# Patient Record
Sex: FEMALE | Race: OTHER | HISPANIC OR LATINO | Employment: FULL TIME | URBAN - METROPOLITAN AREA
[De-identification: names, ages, dates, MRNs, and addresses within clinical notes are randomized per-mention and may not be internally consistent; named-entity substitution may affect disease eponyms.]

---

## 2018-09-27 ENCOUNTER — HOSPITAL ENCOUNTER (EMERGENCY)
Facility: HOSPITAL | Age: 33
Discharge: HOME | End: 2018-09-27
Attending: EMERGENCY MEDICINE | Admitting: EMERGENCY MEDICINE
Payer: COMMERCIAL

## 2018-09-27 VITALS
OXYGEN SATURATION: 100 % | TEMPERATURE: 98.3 F | DIASTOLIC BLOOD PRESSURE: 61 MMHG | HEART RATE: 75 BPM | RESPIRATION RATE: 16 BRPM | SYSTOLIC BLOOD PRESSURE: 122 MMHG

## 2018-09-27 DIAGNOSIS — K30 INDIGESTION: ICD-10-CM

## 2018-09-27 DIAGNOSIS — R11.0 NAUSEA: Primary | ICD-10-CM

## 2018-09-27 LAB
BACTERIA UR QL AUTO: ABNORMAL /HPF
BILIRUB UR QL STRIP: NEGATIVE
CLARITY UR: ABNORMAL
COLOR UR: YELLOW
EXT PREG TEST URINE: NEGATIVE
GLUCOSE UR STRIP-MCNC: NEGATIVE MG/DL
HGB UR QL STRIP.AUTO: NEGATIVE
KETONES UR STRIP-MCNC: NEGATIVE MG/DL
LEUKOCYTE ESTERASE UR QL STRIP: ABNORMAL
NITRITE UR QL STRIP: NEGATIVE
NON-SQ EPI CELLS URNS QL MICRO: ABNORMAL /HPF
PH UR STRIP.AUTO: 6 [PH] (ref 4.5–8)
PROT UR STRIP-MCNC: NEGATIVE MG/DL
RBC #/AREA URNS AUTO: ABNORMAL /HPF
SP GR UR STRIP.AUTO: 1.02 (ref 1–1.03)
UROBILINOGEN UR QL STRIP.AUTO: 0.2 E.U./DL
WBC #/AREA URNS AUTO: ABNORMAL /HPF

## 2018-09-27 PROCEDURE — 99283 EMERGENCY DEPT VISIT LOW MDM: CPT

## 2018-09-27 PROCEDURE — 81001 URINALYSIS AUTO W/SCOPE: CPT

## 2018-09-27 PROCEDURE — 81025 URINE PREGNANCY TEST: CPT | Performed by: EMERGENCY MEDICINE

## 2018-09-27 RX ORDER — FAMOTIDINE 20 MG/1
20 TABLET, FILM COATED ORAL ONCE
Status: DISCONTINUED | OUTPATIENT
Start: 2018-09-27 | End: 2018-09-27 | Stop reason: HOSPADM

## 2018-09-27 RX ORDER — ONDANSETRON 4 MG/1
4 TABLET, ORALLY DISINTEGRATING ORAL ONCE
Status: DISCONTINUED | OUTPATIENT
Start: 2018-09-27 | End: 2018-09-27 | Stop reason: HOSPADM

## 2018-09-27 RX ORDER — ONDANSETRON 4 MG/1
4 TABLET, FILM COATED ORAL EVERY 6 HOURS
Qty: 7 TABLET | Refills: 0 | Status: SHIPPED | OUTPATIENT
Start: 2018-09-27 | End: 2022-01-23

## 2018-09-27 RX ORDER — FAMOTIDINE 20 MG/1
20 TABLET, FILM COATED ORAL 2 TIMES DAILY
Qty: 28 TABLET | Refills: 0 | Status: SHIPPED | OUTPATIENT
Start: 2018-09-27 | End: 2022-01-23

## 2018-09-27 RX ORDER — PHENTERMINE HYDROCHLORIDE 37.5 MG/1
37.5 CAPSULE ORAL EVERY MORNING
COMMUNITY
End: 2022-01-23

## 2018-09-27 NOTE — ED PROVIDER NOTES
History  Chief Complaint   Patient presents with    Nausea     Patient reporting she has been experiencing nausea X3 days  Also reporting abdominal discomfort  Denies fevers, denies diarrhea  History provided by:  Patient  Nausea   The primary symptoms include nausea  Primary symptoms do not include fever, weight loss, fatigue, abdominal pain, vomiting, diarrhea, melena, hematemesis, jaundice, hematochezia, dysuria, myalgias, arthralgias or rash  The illness began 3 to 5 days ago  The onset was gradual  The problem has not changed since onset  The illness does not include chills, anorexia, dysphagia, odynophagia, bloating, constipation, back pain or itching  Associated symptoms comments: indigestion  Associated medical issues do not include inflammatory bowel disease, GERD, gallstones, gastric bypass or bowel resection  Prior to Admission Medications   Prescriptions Last Dose Informant Patient Reported? Taking? phentermine 37 5 MG capsule   Yes Yes   Sig: Take 37 5 mg by mouth every morning      Facility-Administered Medications: None       History reviewed  No pertinent past medical history  Past Surgical History:   Procedure Laterality Date    LAPAROSCOPY      REDUCTION MAMMAPLASTY         History reviewed  No pertinent family history  I have reviewed and agree with the history as documented  Social History   Substance Use Topics    Smoking status: Light Tobacco Smoker    Smokeless tobacco: Not on file    Alcohol use Yes      Comment: occasional         Review of Systems   Constitutional: Negative for appetite change, chills, diaphoresis, fatigue, fever and weight loss  HENT: Negative for congestion, dental problem and rhinorrhea  Eyes: Negative for pain  Respiratory: Negative for chest tightness and shortness of breath  Cardiovascular: Negative for chest pain and leg swelling  Gastrointestinal: Positive for nausea   Negative for abdominal pain, anorexia, bloating, blood in stool, constipation, diarrhea, dysphagia, hematemesis, hematochezia, jaundice, melena and vomiting  Endocrine: Negative for polydipsia, polyphagia and polyuria  Genitourinary: Negative for difficulty urinating, dyspareunia, dysuria, enuresis, flank pain, frequency, hematuria, pelvic pain, vaginal bleeding and vaginal discharge  Musculoskeletal: Negative for arthralgias, back pain and myalgias  Skin: Negative for color change, itching and rash  Neurological: Negative for dizziness, weakness, light-headedness and headaches  Psychiatric/Behavioral: Negative for hallucinations and suicidal ideas  Physical Exam  Physical Exam   Constitutional: She is oriented to person, place, and time  She appears well-developed and well-nourished  HENT:   Mouth/Throat: No oropharyngeal exudate  TMs normal bilaterally no pharyngeal erythema no rhinorrhea nontender palpation of sinuses, normal looking turbinates   Eyes: Conjunctivae and EOM are normal    Neck: Normal range of motion  Neck supple  No meningeal signs   Cardiovascular: Normal rate, regular rhythm, normal heart sounds and intact distal pulses  Pulmonary/Chest: Effort normal and breath sounds normal  No respiratory distress  She has no wheezes  She has no rales  She exhibits no tenderness  Abdominal: Soft  Bowel sounds are normal  She exhibits no distension and no mass  There is no tenderness  No hernia  No cvat   Musculoskeletal: Normal range of motion  She exhibits no edema  Lymphadenopathy:     She has no cervical adenopathy  Neurological: She is alert and oriented to person, place, and time  No cranial nerve deficit  Skin: No rash noted  No erythema  No edema   Psychiatric: She has a normal mood and affect  Her behavior is normal    Nursing note and vitals reviewed        Vital Signs  ED Triage Vitals   Temperature Pulse Respirations Blood Pressure SpO2   09/27/18 1137 09/27/18 1135 09/27/18 1135 09/27/18 1135 09/27/18 1135   98 3 °F (36 8 °C) 75 16 122/61 100 %      Temp Source Heart Rate Source Patient Position - Orthostatic VS BP Location FiO2 (%)   09/27/18 1137 09/27/18 1135 09/27/18 1135 09/27/18 1135 --   Oral Monitor Sitting Right arm       Pain Score       --                  Vitals:    09/27/18 1135   BP: 122/61   Pulse: 75   Patient Position - Orthostatic VS: Sitting       Visual Acuity      ED Medications  Medications   ondansetron (ZOFRAN-ODT) dispersible tablet 4 mg (not administered)   famotidine (PEPCID) tablet 20 mg (not administered)       Diagnostic Studies  Results Reviewed     Procedure Component Value Units Date/Time    POCT urinalysis dipstick [[id number]]  (Abnormal) Resulted:  09/27/18 1228    Lab Status:  Final result Specimen:  Urine from Urine, Other Updated:  09/27/18 1228    POCT pregnancy, urine [[id number]]  (Normal) Resulted:  09/27/18 1228    Lab Status:  Final result Updated:  09/27/18 1228     EXT PREG TEST UR (Ref: Negative) Negative    Urine Microscopic [[id number]] Collected:  09/27/18 1214    Lab Status:   In process Specimen:  Urine from Urine, Clean Catch Updated:  09/27/18 1210    ED Urine Macroscopic [[id number]]  (Abnormal) Collected:  09/27/18 1214    Lab Status:  Final result Specimen:  Urine Updated:  09/27/18 1204     Color, UA Yellow     Clarity, UA Cloudy     pH, UA 6 0     Leukocytes, UA Trace (A)     Nitrite, UA Negative     Protein, UA Negative mg/dl      Glucose, UA Negative mg/dl      Ketones, UA Negative mg/dl      Urobilinogen, UA 0 2 E U /dl      Bilirubin, UA Negative     Blood, UA Negative     Specific Gravity, UA 1 020    Narrative:       CLINITEK RESULT                 No orders to display              Procedures  Procedures       Phone Contacts  ED Phone Contact    ED Course                               MDM  Number of Diagnoses or Management Options  Indigestion:   Nausea:   Diagnosis management comments: Nausea and indigestion with a benign exam-will do urine dip/preg, tx for gastritis    CritCare Time    Disposition  Final diagnoses:   Nausea   Indigestion     Time reflects when diagnosis was documented in both MDM as applicable and the Disposition within this note     Time User Action Codes Description Comment    9/27/2018 12:27 PM Claire Jose Add [R11 0] Nausea     9/27/2018 12:27 PM Frantzak Zandraharis Carmen Add [facility] Indigestion       ED Disposition     ED Disposition Condition Comment    Discharge  [address] discharge to home/self care  Condition at discharge: Good        Follow-up Information     Follow up With Specialties Details Why Contact Info    Infolink  Schedule an appointment as soon as possible for a visit in 2 days  [phone]            Patient's Medications   Discharge Prescriptions    FAMOTIDINE (PEPCID) 20 MG TABLET    Take 1 tablet (20 mg total) by mouth 2 (two) times a day for 28 doses       Start Date: 9/27/2018 End Date: 10/11/2018       Order Dose: 20 mg       Quantity: 28 tablet    Refills: 0    ONDANSETRON (ZOFRAN) 4 MG TABLET    Take 1 tablet (4 mg total) by mouth every 6 (six) hours for 7 doses       Start Date: 9/27/2018 End Date: 9/29/2018       Order Dose: 4 mg       Quantity: 7 tablet    Refills: 0     No discharge procedures on file      ED Provider  Electronically Signed by           Jay Garcia MD  09/27/18 9340

## 2018-09-27 NOTE — DISCHARGE INSTRUCTIONS
Náuseas y vómitos agudos   CUIDADO AMBULATORIO:   Náuseas y vómitos agudos  comienzan de forma repentina, [place] rápidamente y de jesus un período breve de Elaine  Las causas comunes incluyen embarazo, alcohol, infección y medicamentos  Marge Caper en la tori, un ataque al corazón o el desequilibrio del oído interno también pueden causar náuseas y vómitos agudos  Busque atención médica de inmediato si:   · Usted nota bandar en kat vómito o en dee evacuaciones  · Usted siente un dolor súbito e intenso en el pecho y la parte superior de kat abdomen después de tener vómitos antoni o tratar de vomitar  · Usted tiene el greta y el pecho inflamados  · [employer], tiene frío, sed y sequedad en los ojos y la boca  · Usted está orinando muy poco o nada en absoluto  · Usted tiene debilidad muscular, calambres en las piernas y dificultad para respirar  · Kat corazón late más rápido que de costumbre  · Usted continúa vomitando por más de 48 horas  Pregúntele a kat [place] vitaminas y minerales son adecuados para usted  · Usted tiene arcadas secas (vómitos sin que salga nada) frecuentes  · Dee náusea y vómitos no mejoran ni desaparecen después de usar el medicamento  · Usted tiene preguntas o inquietudes acerca de kat condición o tratamiento  El tratamiento para las náuseas y vómitos agudos  puede incluir la administración de medicamentos para calmar kat estómago y detener los vómitos  Es posible que deban administrarle líquidos por vía intravenosa si usted está deshidratado  Evite o controle las náuseas y los vómitos agudos:   · No consuma alcohol  El alcohol podría causarle malestar o irritación estomacal  Janice cantidad elevada de alcohol también puede causar náuseas y vómitos agudos  · Controle el estrés  Los rick de [place] que son el resultado de tensión nerviosa pueden causar náuseas y vómitos  Busque la manera de relajarse y controlar el estrés   Descanse y duerma más     · [place] más líquidos selvin se le indique  Los vómitos pueden llevar a la deshidratación  Es importante beber más líquidos para ayudar a reemplazar los fluidos corporales perdidos  Pregunte a chou médico sobre la cantidad de líquido que necesita maral todos los días y cuáles le recomienda  Chou médico podría recomendarle que tome larisa solución de rehidratación oral (SRO)  Las soluciones de rehidratación oral contienen la cantidad [facility] [place], sales y azúcar que necesita para restituir los líquidos que perdió chou organismo  Pregunte qué tipo de solución de rehidratación oral debe usar, qué cantidad debe maral y dónde puede obtenerla  · Ingiera comidas más pequeñas, más a menudo  Coma pequeñas cantidades de comida cada 2 o 3 horas, incluso si no tiene hambre  Donavan náuseas podrían disminuir si tiene comida en el estómago  · Hable con chou médico antes de maral medicamentos de [facility]  Estos medicamentos pueden causar problemas serios si los [place] junto con ciertos medicamentos o si tiene determinadas condiciones médicas  Podrían tener problemas si [place] larisa dosis demasiado noris o los [place] tran más tiempo de lo indicado  Siga las indicaciones de la etiqueta al pie de la [place]  Acuda a donavan consultas de control con chou médico según le indicaron  Anote donavan preguntas para que se acuerde de hacerlas tran donavan visitas  © 2017 [address] Information is for End User's use only and may not be sold, redistributed or otherwise used for commercial purposes  All illustrations and images included in CareNotes® are the copyrighted property of A D A M , Inc  or Sonu Luu  Esta información es sólo para uso en educación  Chou intención no es darle un consejo médico sobre enfermedades o tratamientos  Colsulte con chou [place] farmacéutico antes de seguir cualquier régimen médico para saber si es seguro y efectivo para usted      Indigestión   LO QUE NECESITA SABER:   La indigestión, también llamada dispepsia, es The [employer], sensación rápida de llenura, dolor o quemazón en el [facility] o [facility]  Es probable que la causa no sea conocida  INSTRUCCIONES SOBRE EL [facility] HOSPITALARIA:   Regrese a la mirza de emergencias si:   · Usted tiene dificultad para tragar  · Usted tiene dolor severo en el estómago que no se va incluso después de maral los medicamentos para el dolor  · Dee evacuaciones intestinales son oscuras o vomita con bandar  · Usted tiene náusea o vómito severos  · Usted siente larisa masa o bulto en el abdomen  Pregúntele a chou Dianna Timothy vitaminas y minerales son adecuados para usted  · Usted tiene dolor, malestar o estreñimiento  · Usted tiene náusea leve a moderada con vómitos e inflamación del estómago  · Chou piel palidece, y se siente más débil y más cansado que lo usual      · Usted tiene preguntas o inquietudes acerca de chou condición o cuidado  Medicamentos:   · Medicamentos,  podrían administrarse para ayudar a disminuir la cantidad de ácido en chou estómago  · [place] dee medicamentos selvin se le haya indicado  Consulte con chou médico si usted alexa que cohu medicamento no le está ayudando o si presenta efectos secundarios  Infórmele si es alérgico a algún medicamento  Mantenga larisa lista actualizada de los [place], las vitaminas y los productos herbales que anabel  Incluya los siguientes datos de los medicamentos: cantidad, frecuencia y motivo de administración  Traiga con usted la lista o los envases de la píldoras a dee citas de seguimiento  Lleve la lista de los medicamentos con usted en moses de larisa emergencia  [facility] síntomas:   · No coma alimentos que pueden irritar chou estómago selvin alimentos picantes o grasosos  No consuma bebidas que contengan cafeína o alcohol  Los chocolates, menta, hierbabuena y cítricos pueden también empeorar dee síntomas  Coma porciones pequeñas varias veces al día en lugar de comidas grandes       · Límite los medicamentos que [address] , selvin los [place], esteroides o narcóticos  Kat médico puede sugerirle otro medicamento que sea [employer]  Consulte con kat médico antes de maral cualquier medicamento sin receta  · Encuentre maneras de reducir el estrés  Aprenda nuevas técnicas de relajación, selvin el ejercicio, la respiración profunda, meditación o escuchar música  · No fume  La nicotina y otros químicos contenidos en los cigarrillos y puros pueden causar indigestión  Pida información a kat médico si usted actualmente fuma y necesita ayuda para dejar de fumar  Los cigarrillos electrónicos o tabaco sin humo todavía contienen nicotina  Consulte con kat médico antes de [employer]  Acuda a donavan consultas de control con kat médico según le indicaron  Es probable que lo Garrett Raid a un gastroenterólogo  Anote donavan preguntas para que se acuerde de hacerlas tran donavan visitas  © 2017 [address] Information is for End User's use only and may not be sold, redistributed or otherwise used for commercial purposes  All illustrations and images included in CareNotes® are the copyrighted property of [employer]  or Sonu Luu  Esta información es sólo para uso en educación  Kat intención no es darle un consejo médico sobre enfermedades o tratamientos  Colsulte con kat Ozell Fabtracee farmacéutico antes de seguir cualquier régimen médico para saber si es seguro y efectivo para usted

## 2019-05-17 ENCOUNTER — HOSPITAL ENCOUNTER (OUTPATIENT)
Dept: RADIOLOGY | Facility: HOSPITAL | Age: 34
Discharge: HOME | End: 2019-05-17
Attending: PODIATRIST
Payer: COMMERCIAL

## 2019-05-17 ENCOUNTER — TRANSCRIBE ORDERS (OUTPATIENT)
Dept: ADMINISTRATIVE | Facility: HOSPITAL | Age: 34
End: 2019-05-17

## 2019-05-17 DIAGNOSIS — M79.672 PAIN IN LEFT FOOT: ICD-10-CM

## 2019-05-17 DIAGNOSIS — M79.671 PAIN IN RIGHT FOOT: ICD-10-CM

## 2019-05-17 DIAGNOSIS — M79.671 PAIN IN RIGHT FOOT: Primary | ICD-10-CM

## 2019-05-17 PROCEDURE — 73630 X-RAY EXAM OF FOOT: CPT

## 2019-12-25 ENCOUNTER — HOSPITAL ENCOUNTER (EMERGENCY)
Facility: HOSPITAL | Age: 34
Discharge: HOME | End: 2019-12-25
Attending: EMERGENCY MEDICINE
Payer: COMMERCIAL

## 2019-12-25 VITALS
RESPIRATION RATE: 17 BRPM | OXYGEN SATURATION: 96 % | TEMPERATURE: 98.5 F | DIASTOLIC BLOOD PRESSURE: 72 MMHG | HEART RATE: 77 BPM | SYSTOLIC BLOOD PRESSURE: 123 MMHG | WEIGHT: 239.2 LBS

## 2019-12-25 DIAGNOSIS — H66.90 OTITIS MEDIA: Primary | ICD-10-CM

## 2019-12-25 DIAGNOSIS — J02.9 PHARYNGITIS, UNSPECIFIED ETIOLOGY: ICD-10-CM

## 2019-12-25 LAB
EXT PREG TEST URINE: NEGATIVE
EXT. CONTROL ED NAV: NORMAL

## 2019-12-25 PROCEDURE — 81025 URINE PREGNANCY TEST: CPT | Performed by: NURSE PRACTITIONER

## 2019-12-25 PROCEDURE — 99283 EMERGENCY DEPT VISIT LOW MDM: CPT | Performed by: EMERGENCY MEDICINE

## 2019-12-25 PROCEDURE — 99283 EMERGENCY DEPT VISIT LOW MDM: CPT

## 2019-12-25 RX ORDER — AMOXICILLIN AND CLAVULANATE POTASSIUM 875; 125 MG/1; MG/1
1 TABLET, FILM COATED ORAL ONCE
Status: COMPLETED | OUTPATIENT
Start: 2019-12-25 | End: 2019-12-25

## 2019-12-25 RX ORDER — AMOXICILLIN AND CLAVULANATE POTASSIUM 875; 125 MG/1; MG/1
1 TABLET, FILM COATED ORAL EVERY 12 HOURS
Qty: 14 TABLET | Refills: 0 | Status: SHIPPED | OUTPATIENT
Start: 2019-12-25 | End: 2020-01-01

## 2019-12-25 RX ADMIN — AMOXICILLIN AND CLAVULANATE POTASSIUM 1 TABLET: 875; 125 TABLET, FILM COATED ORAL at 03:36

## 2019-12-25 NOTE — DISCHARGE INSTRUCTIONS
You have a right ear infection and a sorethroat    You have been placed on Augmentin - take as prescribed  You are to take tylenol and motrin as needed for pain  Follow up with your PCP  You may do warm salt water gargles several times a day for sorethroat pain

## 2019-12-25 NOTE — ED PROVIDER NOTES
History  Chief Complaint   Patient presents with    Sore Throat     sore throat and right ear ache for two days, tylenol 2 hours PTA     This is a morbidly obese female who states has had sorethroat and right ear ache with neck swelling for the last few days  She states she took tylenol 2 hours prior to ED visit  LMP 11-3-19         History provided by:  Medical records and patient   used: No        Prior to Admission Medications   Prescriptions Last Dose Informant Patient Reported? Taking?   famotidine (PEPCID) 20 mg tablet   No No   Sig: Take 1 tablet (20 mg total) by mouth 2 (two) times a day for 28 doses   ondansetron (ZOFRAN) 4 mg tablet   No No   Sig: Take 1 tablet (4 mg total) by mouth every 6 (six) hours for 7 doses   phentermine 37 5 MG capsule   Yes No   Sig: Take 37 5 mg by mouth every morning      Facility-Administered Medications: None       History reviewed  No pertinent past medical history  Past Surgical History:   Procedure Laterality Date    LAPAROSCOPY      REDUCTION MAMMAPLASTY         History reviewed  No pertinent family history  I have reviewed and agree with the history as documented  Social History     Tobacco Use    Smoking status: Light Tobacco Smoker   Substance Use Topics    Alcohol use: Yes     Comment: occasional     Drug use: No        Review of Systems   Constitutional: Negative  HENT: Positive for ear pain and sore throat  Eyes: Negative  Respiratory: Negative  Cardiovascular: Negative  Gastrointestinal: Negative  Endocrine: Negative  Genitourinary: Negative  Musculoskeletal: Negative  Skin: Negative  Allergic/Immunologic: Negative  Neurological: Negative  Hematological: Negative  Psychiatric/Behavioral: Negative  Physical Exam  Physical Exam   Constitutional: She is oriented to person, place, and time  She appears well-developed and well-nourished  Non-toxic appearance  She does not appear ill   No distress  HENT:   Head: Normocephalic and atraumatic  Right Ear: Hearing normal  There is swelling  A middle ear effusion is present  Left Ear: Hearing and tympanic membrane normal    Mouth/Throat: Uvula is midline and mucous membranes are normal  Posterior oropharyngeal erythema present  Tonsils are 2+ on the right  Tonsils are 2+ on the left  No tonsillar exudate  Eyes: Pupils are equal, round, and reactive to light  EOM are normal    Neck: Normal range of motion  Neck supple  Right    Cardiovascular: Normal rate, regular rhythm and normal heart sounds  Pulmonary/Chest: Effort normal and breath sounds normal    Abdominal: Soft  Lymphadenopathy:     She has cervical adenopathy  Neurological: She is alert and oriented to person, place, and time  Skin: Skin is warm and dry  Capillary refill takes less than 2 seconds  Psychiatric: She has a normal mood and affect  Her behavior is normal    Nursing note and vitals reviewed        Vital Signs  ED Triage Vitals [12/25/19 0308]   Temperature Pulse Respirations Blood Pressure SpO2   98 5 °F (36 9 °C) 77 17 123/72 96 %      Temp src Heart Rate Source Patient Position - Orthostatic VS BP Location FiO2 (%)   -- Monitor Sitting Right arm --      Pain Score       4           Vitals:    12/25/19 0308   BP: 123/72   Pulse: 77   Patient Position - Orthostatic VS: Sitting         Visual Acuity      ED Medications  Medications   amoxicillin-clavulanate (AUGMENTIN) 875-125 mg per tablet 1 tablet (1 tablet Oral Given 12/25/19 0336)       Diagnostic Studies  Results Reviewed     Procedure Component Value Units Date/Time    POCT pregnancy, urine [[id number]]  (Normal) Resulted:  12/25/19 0332    Lab Status:  Final result Updated:  12/25/19 0332     EXT PREG TEST UR (Ref: Negative) Negative     Control Valid                 No orders to display              Procedures  Procedures         ED Course                               MDM  Number of Diagnoses or Management Options  Diagnosis management comments: Pharyngitis  ROM    Plan  Augmentin    Pt verbalizes understanding of dc instructions and follow up         Amount and/or Complexity of Data Reviewed  Clinical lab tests: ordered and reviewed  Review and summarize past medical records: yes          Disposition  Final diagnoses:   Otitis media   Pharyngitis, unspecified etiology     Time reflects when diagnosis was documented in both MDM as applicable and the Disposition within this note     Time User Action Codes Description Comment    12/25/2019  3:36 AM Selene Clan Add [H66 90] Otitis media     12/25/2019  3:36 AM Selene Clan Add [J02 9] Pharyngitis, unspecified etiology       ED Disposition     ED Disposition Condition Date/Time Comment    Discharge Stable Wed Dec 25, 2019  3:36 AM Jetty Settle discharge to home/self care  Follow-up Information     Follow up With Specialties Details Why Contact Info Additional [address] [facility] Emergency Department Emergency Medicine  If symptoms worsen [place] [id number]  [phone] [place] [facility], [address] [place] [address], [address] [facility],Sixth [facility] Medicine  call for a family physician [address], [address] [facility] [address]  [address], [address], [address] [place], [place], [address] [facility] [address]          Patient's Medications   Discharge Prescriptions    AMOXICILLIN-CLAVULANATE (AUGMENTIN) 875-125 MG PER TABLET    Take 1 tablet by mouth every 12 (twelve) hours for 7 days       Start Date: 12/25/2019End Date: 1/1/2020       Order Dose: 1 tablet       Quantity: 14 tablet    Refills: 0     No discharge procedures on file      ED Provider  Electronically Signed by           Alison Larsen  12/25/19 2091

## 2022-01-23 ENCOUNTER — HOSPITAL ENCOUNTER (EMERGENCY)
Facility: HOSPITAL | Age: 37
Discharge: HOME | End: 2022-01-23
Attending: EMERGENCY MEDICINE
Payer: MEDICARE

## 2022-01-23 VITALS
DIASTOLIC BLOOD PRESSURE: 63 MMHG | SYSTOLIC BLOOD PRESSURE: 131 MMHG | BODY MASS INDEX: 41.45 KG/M2 | TEMPERATURE: 98 F | OXYGEN SATURATION: 99 % | HEART RATE: 71 BPM | WEIGHT: 264.11 LBS | RESPIRATION RATE: 18 BRPM | HEIGHT: 67 IN

## 2022-01-23 DIAGNOSIS — Z76.89 ENCOUNTER FOR ASSESSMENT OF STD EXPOSURE: Primary | ICD-10-CM

## 2022-01-23 LAB
BILIRUB UR QL STRIP: NEGATIVE
CLARITY UR: CLEAR
COLOR UR: YELLOW
EXT PREG TEST URINE: NEGATIVE
EXT. CONTROL ED NAV: NORMAL
GLUCOSE UR STRIP-MCNC: NEGATIVE MG/DL
HGB UR QL STRIP.AUTO: NEGATIVE
KETONES UR STRIP-MCNC: NEGATIVE MG/DL
LEUKOCYTE ESTERASE UR QL STRIP: NEGATIVE
NITRITE UR QL STRIP: NEGATIVE
PH UR STRIP.AUTO: 5.5 [PH] (ref 4.5–8)
PROT UR STRIP-MCNC: NEGATIVE MG/DL
SP GR UR STRIP.AUTO: >=1.03 (ref 1–1.03)
UROBILINOGEN UR QL STRIP.AUTO: 0.2 E.U./DL

## 2022-01-23 PROCEDURE — 99283 EMERGENCY DEPT VISIT LOW MDM: CPT

## 2022-01-23 PROCEDURE — 81003 URINALYSIS AUTO W/O SCOPE: CPT

## 2022-01-23 PROCEDURE — 99282 EMERGENCY DEPT VISIT SF MDM: CPT | Performed by: EMERGENCY MEDICINE

## 2022-01-23 PROCEDURE — 87591 N.GONORRHOEAE DNA AMP PROB: CPT | Performed by: EMERGENCY MEDICINE

## 2022-01-23 PROCEDURE — 81025 URINE PREGNANCY TEST: CPT | Performed by: EMERGENCY MEDICINE

## 2022-01-23 PROCEDURE — 87491 CHLMYD TRACH DNA AMP PROBE: CPT | Performed by: EMERGENCY MEDICINE

## 2022-01-23 NOTE — ED PROVIDER NOTES
History  Chief Complaint   Patient presents with    Exposure to STD     Patient's partner noticed pimple on penis yesterday and patient would like to get checked out for possible STD  Patient reports she has zero symptoms  Denies urinary symptoms, denies vaginal burning/itching/discharge  A 80-year-old female with no significant past medical history; presents requesting STD testing  Patient's partner (who was also a patient in the ED) noticed a pimple along the shaft of his penis, which prompted patient requesting STD testing  Patient is currently asymptomatic  Patient denies recent fever, chills, chest pain, shortness of breath, abdominal pain, nausea, vomiting, diarrhea, dysuria, vaginal bleeding, vaginal discharge, peripheral edema and rashes  A/P:  Encounter for STD testing, patient currently asymptomatic  Pelvic exam within normal limits  Will check urine for infection and pregnancy  GC/chlamydia swab sent  Patient is requesting blood work for additional testing including HIV, recommend she follow-up with her OBGYN or the [facility]  History provided by:  Patient and medical records  Exposure to STD      None       History reviewed  No pertinent past medical history  Past Surgical History:   Procedure Laterality Date    LAPAROSCOPY      REDUCTION MAMMAPLASTY         History reviewed  No pertinent family history  I have reviewed and agree with the history as documented  E-Cigarette/Vaping     E-Cigarette/Vaping Substances     Social History     Tobacco Use    Smoking status: Never Smoker    Smokeless tobacco: Never Used   Substance Use Topics    Alcohol use: Yes     Comment: occasional     Drug use: No       Review of Systems   All other systems reviewed and are negative        Physical Exam  Physical Exam  General Appearance: alert and oriented, nad, non toxic appearing  Skin:  Warm, dry, intact  HEENT: atraumatic, normocephalic  Neck: Supple, trachea midline  Cardiac: RRR; no murmurs, rub, gallops  Pulmonary: lungs CTAB; no wheezes, rales, rhonchi  Gastrointestinal: abdomen soft, nontender, nondistended; no guarding or rebound tenderness; good bowel sounds, no mass or bruits  Genitourinary: Exam chaperoned by Nirav Rebollar RN  Pelvic exam completed, no vaginal discharge or bleeding  No vaginal vesicles or lesions  Extremities:  no pedal edema, 2+ pulses; no calf tenderness, no clubbing, no cyanosis  Neuro:  no focal motor or sensory deficits, CN 2-12 grossly intact  Psych:  Normal mood and affect, normal judgement and insight      Vital Signs  ED Triage Vitals [01/23/22 0757]   Temperature Pulse Respirations Blood Pressure SpO2   98 °F (36 7 °C) 71 18 131/63 99 %      Temp Source Heart Rate Source Patient Position - Orthostatic VS BP Location FiO2 (%)   Oral Monitor Sitting Right arm --      Pain Score       No Pain           Vitals:    01/23/22 0757   BP: 131/63   Pulse: 71   Patient Position - Orthostatic VS: Sitting         Visual Acuity      ED Medications  Medications - No data to display    Diagnostic Studies  Results Reviewed     Procedure Component Value Units Date/Time    Chlamydia/GC amplified DNA by PCR [[id number]] Collected: 01/23/22 0850    Lab Status:  In process Specimen: Endocervical Updated: 01/23/22 0853    POCT pregnancy, urine [[id number]]  (Normal) Resulted: 01/23/22 0833    Lab Status: Final result Updated: 01/23/22 0833     EXT PREG TEST UR (Ref: Negative) negative     Control valid    Urine Macroscopic, POC [[id number]] Collected: 01/23/22 0831    Lab Status: Final result Specimen: Urine Updated: 01/23/22 0832     Color, UA Yellow     Clarity, UA Clear     pH, UA 5 5     Leukocytes, UA Negative     Nitrite, UA Negative     Protein, UA Negative mg/dl      Glucose, UA Negative mg/dl      Ketones, UA Negative mg/dl      Urobilinogen, UA 0 2 E U /dl      Bilirubin, UA Negative     Blood, UA Negative     Specific Gravity, UA >=1 030    Narrative:      CLINITEK RESULT No orders to display              Procedures  Procedures         ED Course                                             MDM    Disposition  Final diagnoses:   Encounter for assessment of STD exposure     Time reflects when diagnosis was documented in both MDM as applicable and the Disposition within this note     Time User Action Codes Description Comment    1/23/2022  8:52 AM Janet Shepard Add [Z76 89] Encounter for assessment of STD exposure       ED Disposition     ED Disposition Condition Date/Time Comment    Discharge Stable Sun Jan 23, 2022  8:52 AM Ruddy Maldonado discharge to home/self care  Follow-up Information     Follow up With Specialties Details Why Contact Info    OBGYN  Schedule an appointment as soon as possible for a visit in 3 days For re-evaluation     [facility] Internal Medicine Schedule an appointment as soon as possible for a visit  For re-evaluation [address]            There are no discharge medications for this patient  No discharge procedures on file      PDMP Review     None          ED Provider  Electronically Signed by           Ava Nieves DO  01/23/22 8809

## 2022-01-23 NOTE — DISCHARGE INSTRUCTIONS
If your test results are positive you will receive a phone call from the ED with further instruction  You can follow-up with your OBGYN or the [facility] for further STD testing

## 2022-01-23 NOTE — ED NOTES
Patient ambulatory to the bathroom to provide a urine sample        Devaughn Corona RN  01/23/22 2337

## 2022-01-25 LAB
C TRACH DNA SPEC QL NAA+PROBE: NEGATIVE
N GONORRHOEA DNA SPEC QL NAA+PROBE: NEGATIVE

## 2022-02-08 ENCOUNTER — HOSPITAL ENCOUNTER (EMERGENCY)
Facility: HOSPITAL | Age: 37
Discharge: HOME | End: 2022-02-08
Attending: EMERGENCY MEDICINE | Admitting: EMERGENCY MEDICINE
Payer: OTHER MISCELLANEOUS

## 2022-02-08 ENCOUNTER — APPOINTMENT (EMERGENCY)
Dept: RADIOLOGY | Facility: HOSPITAL | Age: 37
End: 2022-02-08

## 2022-02-08 VITALS
OXYGEN SATURATION: 99 % | HEIGHT: 67 IN | SYSTOLIC BLOOD PRESSURE: 148 MMHG | BODY MASS INDEX: 42.46 KG/M2 | RESPIRATION RATE: 18 BRPM | WEIGHT: 270.5 LBS | HEART RATE: 64 BPM | DIASTOLIC BLOOD PRESSURE: 69 MMHG | TEMPERATURE: 98.1 F

## 2022-02-08 DIAGNOSIS — S61.219A FINGER LACERATION: ICD-10-CM

## 2022-02-08 DIAGNOSIS — S61.309A AVULSION OF FINGERNAIL, INITIAL ENCOUNTER: Primary | ICD-10-CM

## 2022-02-08 PROCEDURE — 11730 AVULSION NAIL PLATE SIMPLE 1: CPT | Performed by: EMERGENCY MEDICINE

## 2022-02-08 PROCEDURE — 99285 EMERGENCY DEPT VISIT HI MDM: CPT | Performed by: EMERGENCY MEDICINE

## 2022-02-08 PROCEDURE — 90715 TDAP VACCINE 7 YRS/> IM: CPT

## 2022-02-08 PROCEDURE — 90471 IMMUNIZATION ADMIN: CPT

## 2022-02-08 PROCEDURE — 99283 EMERGENCY DEPT VISIT LOW MDM: CPT

## 2022-02-08 PROCEDURE — 73130 X-RAY EXAM OF HAND: CPT

## 2022-02-08 PROCEDURE — 12001 RPR S/N/AX/GEN/TRNK 2.5CM/<: CPT | Performed by: EMERGENCY MEDICINE

## 2022-02-08 RX ORDER — BUPIVACAINE HYDROCHLORIDE 2.5 MG/ML
10 INJECTION, SOLUTION EPIDURAL; INFILTRATION; INTRACAUDAL ONCE
Status: COMPLETED | OUTPATIENT
Start: 2022-02-08 | End: 2022-02-08

## 2022-02-08 RX ORDER — ACETAMINOPHEN 325 MG/1
650 TABLET ORAL ONCE
Status: COMPLETED | OUTPATIENT
Start: 2022-02-08 | End: 2022-02-08

## 2022-02-08 RX ORDER — NAPROXEN 500 MG/1
500 TABLET ORAL 2 TIMES DAILY WITH MEALS
Qty: 30 TABLET | Refills: 0 | Status: SHIPPED | OUTPATIENT
Start: 2022-02-08

## 2022-02-08 RX ORDER — CEPHALEXIN 500 MG/1
500 CAPSULE ORAL EVERY 6 HOURS SCHEDULED
Qty: 20 CAPSULE | Refills: 0 | Status: SHIPPED | OUTPATIENT
Start: 2022-02-08 | End: 2022-02-13

## 2022-02-08 RX ORDER — CEPHALEXIN 250 MG/1
500 CAPSULE ORAL ONCE
Status: COMPLETED | OUTPATIENT
Start: 2022-02-08 | End: 2022-02-08

## 2022-02-08 RX ORDER — CEPHALEXIN 250 MG/1
500 CAPSULE ORAL EVERY 6 HOURS SCHEDULED
Status: DISCONTINUED | OUTPATIENT
Start: 2022-02-08 | End: 2022-02-08

## 2022-02-08 RX ADMIN — BUPIVACAINE HYDROCHLORIDE 10 ML: 2.5 INJECTION, SOLUTION EPIDURAL; INFILTRATION; INTRACAUDAL at 09:36

## 2022-02-08 RX ADMIN — TETANUS TOXOID, REDUCED DIPHTHERIA TOXOID AND ACELLULAR PERTUSSIS VACCINE, ADSORBED 0.5 ML: 5; 2.5; 8; 8; 2.5 SUSPENSION INTRAMUSCULAR at 09:38

## 2022-02-08 RX ADMIN — ACETAMINOPHEN 650 MG: 325 TABLET, FILM COATED ORAL at 09:38

## 2022-02-08 RX ADMIN — CEPHALEXIN 500 MG: 250 CAPSULE ORAL at 11:09

## 2022-02-08 NOTE — Clinical Note
Jenna Bumpers was seen and treated in our emergency department on 2/8/2022  No work until cleared by Family Doctor/Orthopedics        Diagnosis:     Tiff  is off the rest of the shift today  She may return on this date: If you have any questions or concerns, please don't hesitate to call        Salo Mata MD    ______________________________           _______________          _______________  Hospital Representative                              Date                                Time

## 2022-02-08 NOTE — ED ATTENDING ATTESTATION
2/8/2022  IKatya MD, saw and evaluated the patient  I have discussed the patient with the resident/non-physician practitioner and agree with the resident's/non-physician practitioner's findings, Plan of Care, and MDM as documented in the resident's/non-physician practitioner's note, except where noted  All available labs and Radiology studies were reviewed  I was present for key portions of any procedure(s) performed by the resident/non-physician practitioner and I was immediately available to provide assistance  At this point I agree with the current assessment done in the Emergency Department  I have conducted an independent evaluation of this patient a history and physical is as follows:  Patient with injury to right 5th finger at work while working on a machine, horizontal laceration to nail with partially avulsed nail (picture under media tab), patient able to move the finger, tendon function and sensation intact, pulp laceration involving distal phlaynx under nail bed  We will get XR to r/o Tuft fracture, confirm Tetanus, we will digital block, remove nail, suture nailbed  Will give Hand Surgery follow up, Abx       ED Course         Critical Care Time  Procedures

## 2022-02-08 NOTE — ED NOTES
Pt  Discharged, verbalized understanding of all instructions, as well as re-dressing wound for soiled dressing   Pt  Ambulated steadily from ER in [address], RN  02/08/22 4466

## 2022-02-08 NOTE — ED PROVIDER NOTES
History  Chief Complaint   Patient presents with    Finger Injury     Pt reports right 5th digit injury at work, reports "the whole nail came off"     38 yo F no significant PMHx presents to the ED with injury to her R fifth digit nail bed  Patient is a  at [employer], was at work this morning turning the machine on when she got her pinkie finger stuck in the machine  She presents with a laceration through her nail, down to the nail bed and finger pulp just distal to the nail plate and lunula  Patient has natural nails with gel nail polish on  Not up-to-date with her tetanus vaccine, no medical problems, no medicine on a daily basis  None       History reviewed  No pertinent past medical history  Past Surgical History:   Procedure Laterality Date    LAPAROSCOPY      REDUCTION MAMMAPLASTY         History reviewed  No pertinent family history  I have reviewed and agree with the history as documented  E-Cigarette/Vaping     E-Cigarette/Vaping Substances     Social History     Tobacco Use    Smoking status: Never Smoker    Smokeless tobacco: Never Used   Substance Use Topics    Alcohol use: Yes     Comment: occasional     Drug use: No        Review of Systems    Physical Exam  ED Triage Vitals [02/08/22 0813]   Temperature Pulse Respirations Blood Pressure SpO2   98 1 °F (36 7 °C) 64 18 148/69 99 %      Temp Source Heart Rate Source Patient Position - Orthostatic VS BP Location FiO2 (%)   Oral Monitor Sitting Left arm --      Pain Score       7             Orthostatic Vital Signs  Vitals:    02/08/22 0813   BP: 148/69   Pulse: 64   Patient Position - Orthostatic VS: Sitting       Physical Exam  Vitals and nursing note reviewed  Constitutional:       General: She is not in acute distress  Appearance: She is well-developed  HENT:      Head: Normocephalic and atraumatic     Eyes:      Conjunctiva/sclera: Conjunctivae normal    Cardiovascular:      Rate and Rhythm: Normal rate and regular rhythm  Heart sounds: No murmur heard  Pulmonary:      Effort: Pulmonary effort is normal  No respiratory distress  Breath sounds: Normal breath sounds  Abdominal:      Palpations: Abdomen is soft  Tenderness: There is no abdominal tenderness  Musculoskeletal:      Cervical back: Neck supple  Comments: R hand fifth digit with laceration through the nail down to the nail pulp  Nail palte and lunula intact  No sensation deficit  Patient able to flex PIP, DIP, and MCP  Joint without difficulty  Clinical images available under media tab in patient's chart  Skin:     General: Skin is warm and dry  Capillary Refill: Capillary refill takes less than 2 seconds  Neurological:      Mental Status: She is alert and oriented to person, place, and time  Psychiatric:         Mood and Affect: Mood normal          ED Medications  Medications   bupivacaine (PF) (MARCAINE) 0 25 % injection 10 mL (10 mL Infiltration Given 2/8/22 0936)   acetaminophen (TYLENOL) tablet 650 mg (650 mg Oral Given 2/8/22 0938)   tetanus-diphtheria-acellular pertussis (BOOSTRIX) IM injection 0 5 mL (0 5 mL Intramuscular Given 2/8/22 0938)   cephalexin (KEFLEX) capsule 500 mg (500 mg Oral Given 2/8/22 1109)       Diagnostic Studies  Results Reviewed     None                 XR hand 3+ views RIGHT   ED Interpretation by Salo Mata MD (02/08 2210)   No obvious fractures or avulsions             Procedures  Nerve block    Date/Time: 2/8/2022 10:51 AM  Performed by: Salo Mata MD  Authorized by: Salo Mata MD     Patient location:  ED  [place] Protocol:  Consent: Verbal consent obtained    Consent given by: patient  Patient understanding: patient states understanding of the procedure being performed  Patient consent: the patient's understanding of the procedure matches consent given  Procedure consent: procedure consent matches procedure scheduled  Relevant documents: relevant documents present and verified  Test results: test results available and properly labeled  Site marked: the operative site was marked  Radiology Images displayed and confirmed  If images not available, report reviewed: imaging studies available  Patient identity confirmed: verbally with patient      Indications:     Indications:  Procedural anesthesia  Location:     Body area:  Upper extremity    Upper extremity nerve:  Digital    Nerve type:  Peripheral    Laterality:  Right  Pre-procedure details:     Skin preparation:  Alcohol  Skin anesthesia (see MAR for exact dosages):     Skin anesthesia method:  Local infiltration    Local anesthetic:  Bupivacaine 0 25% w/o epi  Procedure details (see MAR for exact dosages): Block needle gauge:  25 G    Anesthetic injected:  Bupivacaine 0 25% w/o epi  Post-procedure details:     Dressing:  Sterile dressing    Outcome:  Anesthesia achieved    Patient tolerance of procedure: Tolerated well, no immediate complications    Laceration repair    Date/Time: 2/8/2022 10:53 AM  Performed by: Maia Mane MD  Authorized by: Maia Mane MD   Consent: Verbal consent obtained  Risks and benefits: risks, benefits and alternatives were discussed  Consent given by: patient  Patient understanding: patient states understanding of the procedure being performed  Patient consent: the patient's understanding of the procedure matches consent given  Procedure consent: procedure consent matches procedure scheduled  Relevant documents: relevant documents present and verified  Test results: test results available and properly labeled  Site marked: the operative site was marked  Radiology Images displayed and confirmed   If images not available, report reviewed: imaging studies available  Patient identity confirmed: verbally with patient  Body area: upper extremity  Location details: right small finger  Laceration length: 0 5 cm  Foreign bodies: no foreign bodies  Tendon involvement: none  Nerve involvement: none  Vascular damage: no  Anesthesia: digital block    Anesthesia:  Local Anesthetic: bupivacaine 0 25% without epinephrine  Anesthetic total: 7 mL    Sedation:  Patient sedated: no      Wound Dehiscence:  Superficial Wound Dehiscence: simple closure      Procedure Details:  Irrigation solution: saline  Irrigation method: tap  Amount of cleaning: standard  Debridement: minimal  Mucous membrane closure: 5-0 Chromic gut  Number of sutures: 2  Technique: simple  Approximation: close  Approximation difficulty: simple  Dressing: gauze roll and non-adhesive packing strip  Comments: Avulsed nail removed, underlying nailbed matrix repaired with 2 absorbable sutures  ED Course  ED Course as of 02/08/22 1135   Tue Feb 08, 2022   0919 Contacted hand surgery on call for recommendations regarding patient's care plan  1057 Per hand surgery: I would remove all of the nail prior to nail bed repair  I would just cover the nail bed with Xeroform  Recommending not to replace nail, just cover with Xeroform and see hand surgery outpatient  MDM  Number of Diagnoses or Management Options  Avulsion of fingernail, initial encounter  Finger laceration  Diagnosis management comments: 38 yo F no significant PMHx presents to the ED with injury to her R fifth digit nail bed  Nail avulsion and nailbed laceration  Hand Surgery on call contacted for recommendation  Recommending removal of the whole nail and placement of xeroform rather than replacement and stenting with nail  Fifth digit on the R hand blocked with bupivacaine  Distal portion of the nail removed (proximal, intact nail left in place)  Nail bed repaired with 2 sutures (5-0 chromic gut)  Xeroform placed over repaired nail bed  Bulky dressing placed over finger and hand to keep in place  Ambulatory referral placed for hand surgery  Discussed keeping nail clean and dressed until hand surgery follow-up   Patient understanding and agreeable  Return precautions discussed  Patient discharged home with hand surgery follow-up  Tetanus updated  One dose of Keflex given here  Patient sent home with 5 day course of Keflex  Disposition  Final diagnoses:   Avulsion of fingernail, initial encounter   Finger laceration     Time reflects when diagnosis was documented in both MDM as applicable and the Disposition within this note     Time User Action Codes Description Comment    2/8/2022 10:21 AM Spencer Olivares Add [S61 309A] Avulsion of fingernail, initial encounter     2/8/2022 10:21 AM Spencer Olivares Add [T10 649K] Finger laceration       ED Disposition     ED Disposition Condition Date/Time Comment    Discharge Stable Tue Feb 8, 2022 10:22 AM Lily Dawn discharge to home/self care  Follow-up Information    None         Patient's Medications   Discharge Prescriptions    CEPHALEXIN (KEFLEX) 500 MG CAPSULE    Take 1 capsule (500 mg total) by mouth every 6 (six) hours for 5 days       Start Date: 2/8/2022  End Date: 2/13/2022       Order Dose: 500 mg       Quantity: 20 capsule    Refills: 0    NAPROXEN (NAPROSYN) 500 MG TABLET    Take 1 tablet (500 mg total) by mouth 2 (two) times a day with meals       Start Date: 2/8/2022  End Date: --       Order Dose: 500 mg       Quantity: 30 tablet    Refills: 0         PDMP Review     None           ED Provider  Attending physically available and evaluated Lily Dawn I managed the patient along with the ED Attending      Electronically Signed by         Karen Merchant MD  02/08/22 2635 1

## 2022-02-08 NOTE — DISCHARGE INSTRUCTIONS
You were seen in the Emergency Department today for a finger nail avulsion and nail bed laceration  We have removed the nail and repaired the nailbed with two absorbable sutures  You will need to follow-up with hand surgery for further management  You will receive a call from them to schedule an appointment  Keep the finger dressed with gauze and tape provided to you  If the dressing gets soaked with blood, pleas remove the entire dressing and re-dress as shown  We are sending you home with a course of antibiotics to prevent infection  Please take one pill by mouth 4 times per day for 5 days  We are also sending you with Naproxen for pain control  You may take one pill by mouth twice per day for pain  Please follow up with hand surgery as soon as you can  Please return to the Emergency Department if you experience worsening of your current symptoms, or any other concerning symptoms

## 2022-02-08 NOTE — ED NOTES
Small finger of R hand dressed by Dr Gilberto Escalona  Pt  Offers no other complaints at this time       Mariela Milligan RN  02/08/22 1117

## 2022-05-02 ENCOUNTER — APPOINTMENT (OUTPATIENT)
Dept: RADIOLOGY | Age: 37
End: 2022-05-02
Payer: OTHER MISCELLANEOUS

## 2022-05-02 ENCOUNTER — APPOINTMENT (OUTPATIENT)
Dept: URGENT CARE | Age: 37
End: 2022-05-02
Payer: OTHER MISCELLANEOUS

## 2022-05-02 DIAGNOSIS — M25.532 LEFT WRIST PAIN: Primary | ICD-10-CM

## 2022-05-02 DIAGNOSIS — M25.532 LEFT WRIST PAIN: ICD-10-CM

## 2022-05-02 PROCEDURE — G0382 LEV 3 HOSP TYPE B ED VISIT: HCPCS

## 2022-05-02 PROCEDURE — 73110 X-RAY EXAM OF WRIST: CPT

## 2022-05-02 PROCEDURE — 99283 EMERGENCY DEPT VISIT LOW MDM: CPT

## 2022-05-20 ENCOUNTER — APPOINTMENT (OUTPATIENT)
Dept: URGENT CARE | Age: 37
End: 2022-05-20
Payer: OTHER MISCELLANEOUS

## 2022-05-20 PROCEDURE — 99213 OFFICE O/P EST LOW 20 MIN: CPT | Performed by: PREVENTIVE MEDICINE

## 2022-09-14 ENCOUNTER — TELEPHONE (OUTPATIENT)
Dept: OBGYN CLINIC | Facility: CLINIC | Age: 37
End: 2022-09-14

## 2022-11-30 NOTE — PRE-PROCEDURE INSTRUCTIONS
No outpatient medications have been marked as taking for the 12/5/22 encounter [facility] Encounter)  Have you had / have a sore throat? No  Have you had / have a cough less than 1 week? No  Have you had / have a fever greater than 100 0 - 100  4? No  Are you experiencing any shortness of breath? No    Review with patient via phone medications and showering instructions  She says she don't take any medicine  Instructed to avoid all ASA and OTC Vit/Supp prior to surgery and to avoid NSAIDs 3 days prior to surgery per anesthesia instructions  Tylenol ok to take prn  [facility] call with surgery schedule time, nothing eat or drink after midnight  Verbalized understanding

## 2022-12-03 ENCOUNTER — ANESTHESIA EVENT (OUTPATIENT)
Dept: PERIOP | Facility: HOSPITAL | Age: 37
End: 2022-12-03

## 2022-12-03 NOTE — ANESTHESIA PREPROCEDURE EVALUATION
Procedure:  BUNIONECTOMY (Right: Foot)    Relevant Problems   No relevant active problems        Physical Exam    Airway    Mallampati score: I  TM Distance: >3 FB  Neck ROM: full     Dental   No notable dental hx     Cardiovascular  Cardiovascular exam normal    Pulmonary  Pulmonary exam normal     Other Findings        Anesthesia Plan  ASA Score- 2     Anesthesia Type- IV sedation with anesthesia with ASA Monitors  Additional Monitors:   Airway Plan:           Plan Factors-Exercise tolerance (METS): >4 METS  Chart reviewed  Existing labs reviewed  Patient is not a current smoker  Patient not instructed to abstain from smoking on day of procedure  Patient did not smoke on day of surgery  Induction- intravenous  Postoperative Plan-     Informed Consent- Anesthetic plan and risks discussed with patient  I personally reviewed this patient with the CRNA  Discussed and agreed on the Anesthesia Plan with the CRNA  Teodora Wayne

## 2022-12-05 ENCOUNTER — APPOINTMENT (OUTPATIENT)
Dept: RADIOLOGY | Facility: HOSPITAL | Age: 37
End: 2022-12-05

## 2022-12-05 ENCOUNTER — ANESTHESIA (OUTPATIENT)
Dept: PERIOP | Facility: HOSPITAL | Age: 37
End: 2022-12-05

## 2022-12-05 ENCOUNTER — HOSPITAL ENCOUNTER (OUTPATIENT)
Facility: HOSPITAL | Age: 37
Setting detail: OUTPATIENT SURGERY
Discharge: HOME | End: 2022-12-05
Attending: PODIATRIST | Admitting: PODIATRIST

## 2022-12-05 VITALS
OXYGEN SATURATION: 100 % | HEART RATE: 59 BPM | DIASTOLIC BLOOD PRESSURE: 73 MMHG | TEMPERATURE: 98.2 F | WEIGHT: 170 LBS | RESPIRATION RATE: 16 BRPM | SYSTOLIC BLOOD PRESSURE: 130 MMHG | BODY MASS INDEX: 26.68 KG/M2 | HEIGHT: 67 IN

## 2022-12-05 DIAGNOSIS — Z98.890 POST-OPERATIVE STATE: Primary | ICD-10-CM

## 2022-12-05 LAB
EXT PREGNANCY TEST URINE: NEGATIVE
EXT. CONTROL: NORMAL

## 2022-12-05 DEVICE — CANNULATED SCREW
Type: IMPLANTABLE DEVICE | Site: FOOT | Status: FUNCTIONAL
Brand: ASNIS

## 2022-12-05 RX ORDER — MAGNESIUM HYDROXIDE 1200 MG/15ML
LIQUID ORAL AS NEEDED
Status: DISCONTINUED | OUTPATIENT
Start: 2022-12-05 | End: 2022-12-05 | Stop reason: HOSPADM

## 2022-12-05 RX ORDER — PROPOFOL 10 MG/ML
INJECTION, EMULSION INTRAVENOUS CONTINUOUS PRN
Status: DISCONTINUED | OUTPATIENT
Start: 2022-12-05 | End: 2022-12-05

## 2022-12-05 RX ORDER — SODIUM CHLORIDE, SODIUM LACTATE, POTASSIUM CHLORIDE, CALCIUM CHLORIDE 600; 310; 30; 20 MG/100ML; MG/100ML; MG/100ML; MG/100ML
125 INJECTION, SOLUTION INTRAVENOUS CONTINUOUS
Status: DISCONTINUED | OUTPATIENT
Start: 2022-12-05 | End: 2022-12-05 | Stop reason: HOSPADM

## 2022-12-05 RX ORDER — ONDANSETRON 2 MG/ML
4 INJECTION INTRAMUSCULAR; INTRAVENOUS ONCE AS NEEDED
Status: DISCONTINUED | OUTPATIENT
Start: 2022-12-05 | End: 2022-12-05 | Stop reason: HOSPADM

## 2022-12-05 RX ORDER — SODIUM CHLORIDE, SODIUM LACTATE, POTASSIUM CHLORIDE, CALCIUM CHLORIDE 600; 310; 30; 20 MG/100ML; MG/100ML; MG/100ML; MG/100ML
INJECTION, SOLUTION INTRAVENOUS CONTINUOUS PRN
Status: DISCONTINUED | OUTPATIENT
Start: 2022-12-05 | End: 2022-12-05

## 2022-12-05 RX ORDER — MIDAZOLAM HYDROCHLORIDE 2 MG/2ML
INJECTION, SOLUTION INTRAMUSCULAR; INTRAVENOUS AS NEEDED
Status: DISCONTINUED | OUTPATIENT
Start: 2022-12-05 | End: 2022-12-05

## 2022-12-05 RX ORDER — CEFAZOLIN SODIUM 2 G/50ML
2000 SOLUTION INTRAVENOUS ONCE
Status: DISCONTINUED | OUTPATIENT
Start: 2022-12-05 | End: 2022-12-05 | Stop reason: HOSPADM

## 2022-12-05 RX ORDER — FENTANYL CITRATE/PF 50 MCG/ML
50 SYRINGE (ML) INJECTION
Status: DISCONTINUED | OUTPATIENT
Start: 2022-12-05 | End: 2022-12-05 | Stop reason: HOSPADM

## 2022-12-05 RX ORDER — SODIUM CHLORIDE, SODIUM LACTATE, POTASSIUM CHLORIDE, CALCIUM CHLORIDE 600; 310; 30; 20 MG/100ML; MG/100ML; MG/100ML; MG/100ML
75 INJECTION, SOLUTION INTRAVENOUS CONTINUOUS
Status: DISCONTINUED | OUTPATIENT
Start: 2022-12-05 | End: 2022-12-05 | Stop reason: HOSPADM

## 2022-12-05 RX ORDER — OXYCODONE HYDROCHLORIDE AND ACETAMINOPHEN 5; 325 MG/1; MG/1
1 TABLET ORAL EVERY 4 HOURS PRN
Qty: 20 TABLET | Refills: 0 | Status: SHIPPED | OUTPATIENT
Start: 2022-12-05 | End: 2022-12-12

## 2022-12-05 RX ORDER — CHLORHEXIDINE GLUCONATE 0.12 MG/ML
15 RINSE ORAL ONCE
Status: COMPLETED | OUTPATIENT
Start: 2022-12-05 | End: 2022-12-05

## 2022-12-05 RX ORDER — LIDOCAINE HYDROCHLORIDE 10 MG/ML
INJECTION, SOLUTION EPIDURAL; INFILTRATION; INTRACAUDAL; PERINEURAL AS NEEDED
Status: DISCONTINUED | OUTPATIENT
Start: 2022-12-05 | End: 2022-12-05

## 2022-12-05 RX ORDER — BUPIVACAINE HYDROCHLORIDE 5 MG/ML
INJECTION, SOLUTION PERINEURAL AS NEEDED
Status: DISCONTINUED | OUTPATIENT
Start: 2022-12-05 | End: 2022-12-05 | Stop reason: HOSPADM

## 2022-12-05 RX ORDER — PROPOFOL 10 MG/ML
INJECTION, EMULSION INTRAVENOUS AS NEEDED
Status: DISCONTINUED | OUTPATIENT
Start: 2022-12-05 | End: 2022-12-05

## 2022-12-05 RX ORDER — FENTANYL CITRATE 50 UG/ML
INJECTION, SOLUTION INTRAMUSCULAR; INTRAVENOUS AS NEEDED
Status: DISCONTINUED | OUTPATIENT
Start: 2022-12-05 | End: 2022-12-05

## 2022-12-05 RX ORDER — CEFAZOLIN SODIUM 2 G/50ML
SOLUTION INTRAVENOUS AS NEEDED
Status: DISCONTINUED | OUTPATIENT
Start: 2022-12-05 | End: 2022-12-05

## 2022-12-05 RX ORDER — GLYCOPYRROLATE 0.2 MG/ML
INJECTION INTRAMUSCULAR; INTRAVENOUS AS NEEDED
Status: DISCONTINUED | OUTPATIENT
Start: 2022-12-05 | End: 2022-12-05

## 2022-12-05 RX ORDER — ONDANSETRON 2 MG/ML
INJECTION INTRAMUSCULAR; INTRAVENOUS AS NEEDED
Status: DISCONTINUED | OUTPATIENT
Start: 2022-12-05 | End: 2022-12-05

## 2022-12-05 RX ORDER — OXYCODONE HYDROCHLORIDE AND ACETAMINOPHEN 5; 325 MG/1; MG/1
1 TABLET ORAL ONCE AS NEEDED
Status: DISCONTINUED | OUTPATIENT
Start: 2022-12-05 | End: 2022-12-05 | Stop reason: HOSPADM

## 2022-12-05 RX ADMIN — LIDOCAINE HYDROCHLORIDE 50 MG: 10 INJECTION, SOLUTION EPIDURAL; INFILTRATION; INTRACAUDAL; PERINEURAL at 13:17

## 2022-12-05 RX ADMIN — PROPOFOL 100 MG: 10 INJECTION, EMULSION INTRAVENOUS at 13:17

## 2022-12-05 RX ADMIN — SODIUM CHLORIDE, SODIUM LACTATE, POTASSIUM CHLORIDE, AND CALCIUM CHLORIDE: .6; .31; .03; .02 INJECTION, SOLUTION INTRAVENOUS at 13:00

## 2022-12-05 RX ADMIN — MIDAZOLAM 2 MG: 1 INJECTION INTRAMUSCULAR; INTRAVENOUS at 13:08

## 2022-12-05 RX ADMIN — GLYCOPYRROLATE 0.4 MG: 0.4 INJECTION INTRAMUSCULAR; INTRAVENOUS at 13:30

## 2022-12-05 RX ADMIN — FENTANYL CITRATE 50 MCG: 50 INJECTION, SOLUTION INTRAMUSCULAR; INTRAVENOUS at 13:17

## 2022-12-05 RX ADMIN — CEFAZOLIN SODIUM 2000 MG: 2 SOLUTION INTRAVENOUS at 13:10

## 2022-12-05 RX ADMIN — PROPOFOL 100 MCG/KG/MIN: 10 INJECTION, EMULSION INTRAVENOUS at 13:17

## 2022-12-05 RX ADMIN — CHLORHEXIDINE GLUCONATE 0.12% ORAL RINSE 15 ML: 1.2 LIQUID ORAL at 11:43

## 2022-12-05 RX ADMIN — FENTANYL CITRATE 50 MCG: 50 INJECTION, SOLUTION INTRAMUSCULAR; INTRAVENOUS at 14:38

## 2022-12-05 RX ADMIN — FENTANYL CITRATE 50 MCG: 50 INJECTION, SOLUTION INTRAMUSCULAR; INTRAVENOUS at 14:46

## 2022-12-05 RX ADMIN — ONDANSETRON 4 MG: 2 INJECTION INTRAMUSCULAR; INTRAVENOUS at 14:14

## 2022-12-05 NOTE — DISCHARGE SUMMARY
Discharge Summary Outpatient Procedure Podiatry -   Oneil Ren 40 y o  female MRN: [id number]  Unit/Bed#: OR POOL Encounter: [id number]    Admission Date: 12/5/2022     Admitting Diagnosis: Bunion of right foot [M21 611]    Discharge Diagnosis: same    Procedures Performed: BUNIONECTOMY: 09417 (CPT®) (right)    Complications: none    Condition at Discharge: stable    Discharge instructions/Information to patient and family:   See after visit summary for information provided to patient and family  Provisions for Follow-Up Care/Important appointments:  See after visit summary for information related to follow-up care and any pertinent home health orders  Discharge Medications:  See after visit summary for reconciled discharge medications provided to patient and family

## 2022-12-05 NOTE — DISCHARGE INSTRUCTIONS
Discharge Instructions - Podiatry    Weight Bearing Status: Partial weight bearing to heel wearing surgical shoe on right foot  Use crutches as needed  Pain: Continue analgesics as directed    Follow-up appointment instructions: Please make an appointment within one week of discharge with Dr Sona Croft  Contact sooner if any increase in pain, or signs of infection occur    Wound Care: Leave dressings clean, dry, and intact to first office visit

## 2022-12-05 NOTE — ANESTHESIA POSTPROCEDURE EVALUATION
Post-Op Assessment Note    CV Status:  Stable  Pain Score: 1    Pain management: adequate     Mental Status:  Alert and awake   Hydration Status:  Stable   PONV Controlled:  None   Airway Patency:  Patent      Post Op Vitals Reviewed: Yes      Staff: CRNA         No notable events documented      BP (!) 180/88 (12/05/22 1425)    Temp 98 5 °F (36 9 °C) (12/05/22 1425)    Pulse 90 (12/05/22 1425)   Resp 16 (12/05/22 1425)    SpO2 100 % (12/05/22 1425)

## 2022-12-05 NOTE — OP NOTE
OPERATIVE REPORT - Podiatry  PATIENT NAME: aKrina Coats    :  1985  MRN: [id number]  Pt Location:  OR ROOM 11    SURGERY DATE: 2022    Surgeon(s) and Role:     * Lorin Cook DPM - Primary     * Milton Ordoñez DPM - Assisting    Pre-op Diagnosis:  Bunion of right foot [M21 611]    Post-Op Diagnosis Codes:     * Bunion of right foot [M21 611]    Procedure(s) (LRB):  BUNIONECTOMY (Right)    Specimen(s):  * No specimens in log *    Estimated Blood Loss:   Minimal    Drains:  * No LDAs found *    Anesthesia Type:   IV Sedation with Anesthesia with 10 ml of 2% Lidocaine and 0 5% Bupivacaine in a 1:1 mixture    Hemostasis:  Ankle tourniquet 250mmHg  Electrocautery    Materials:  Implant Name Type Inv  Item Serial No   Lot No  LRB No  Used Action   3 0 x 15 MM   MICROASNIS SCREW      Right 1 Implanted   3 0 X 16MM   MICROASNIS SCREW      Right 1 Implanted     2-0 Vicryl suture  3-0 Vicryl suture  4-0 Prolene suture    Operative Findings:  Consistent with pre-op diagnosis    Complications:   None    Procedure and Technique:     Under mild sedation, the patient was brought into the operating room and placed on the operating room table in the supine position  IV sedation was achieved by anesthesia team and a universal timeout was performed where all parties are in agreement of correct patient, correct procedure and correct site  A pneumatic tourniquet was then placed over the patient's right ankle with ample padding  The foot was then prepped and draped in the usual aseptic manner  An esmarch bandage was used to exsangunate the foot and the pneumatic tourniquet was then inflated to 250mmHg  Attention was then directed to the right foot  A jay block was performed on the field consisting of 10 ml of 2% Lidocaine and 0 5% Bupivacaine in a 1:1 mixture  A linear longitudinal incision was then drawn over the dorsomedial 1st MTPJ using a skin marker  This skin incision was made with a 15 blade   The incision was then deepened down to capsule and periosteum with sharp and blunt dissection with care taken to protect neurovascular structures and with superficial veins being cauterized as needed  An L shaped capsular and periosteal incision was then made with a 15 blade and capsule and periosteum was reflected off bone to expose the distal 1st metatarsal  Dissection was carried laterally into the 1st interspace and a lateral release was performed  A sagittal saw was then used to resect the prominent medial eminence of the 1st metatarsal  The sagittal saw was then used to make a chevron shaped osteotomy in the distal 1st metatarsal  The capital fragment was then shifted laterally and impacted onto the metatarsal shaft in the corrected position  Two K wires were then inserted from dorsal to plantar across the osteotomy to serve as temoprary fixation and to allow for cannulated screw placement  Two partially threaded cannulated 3mm [employer] micro asnis screws were then inserted over the K wires per  protocol and the K wires were removed  The medial overhanging shelf of bone was then resected with a sagittal saw  A medial capsulotomy was then performed  The site was irrigated with normal saline using a bulb syringe  Capsular and periosteal closure was obtained using 2-0 Vicryl suture in a combination of running and interrupted fashion  Subcutaneous closure was obtained using 3-0 Vicryl suture in interrupted fashion  Skin closure was obtained using 4-0 Prolene suture in interrupted fashion  A post op jay block was administered consisting of 10ml of 0 5% Bupivacaine  The site was dressed with betadine soaked adaptic, 4x4 gauze, joce, and 4in ACE  The tourniquet was deflated at approximately 52 min and normal hyperemic response was noted to all digits  The patient tolerated the procedure and anesthesia well without immediate complications and transferred to PACU with vital signs stable       Dr Charlee Peters was present during the entire procedure and participated in all key aspects  SIGNATURE: Abdoul Fernandez DPM  DATE: December 5, 2022  TIME: 4:18 PM      Portions of the record may have been created with voice recognition software  Occasional wrong word or "sound a like" substitutions may have occurred due to the inherent limitations of voice recognition software  Read the chart carefully and recognize, using context, where substitutions have occurred

## 2022-12-05 NOTE — NURSING NOTE
Pt is awake,alert,tolerated diet, written and verbal instructions given, pt verbalizes an understanding, surgical shoe on, using crutches, denies pain

## 2023-01-06 NOTE — PRE-PROCEDURE INSTRUCTIONS
No outpatient medications have been marked as taking for the 1/16/23 encounter [facility] Encounter)  Have you had / have a sore throat? No  Have you had / have a cough less than 1 week? No  Have you had / have a fever greater than 100 0 - 100  4? No  Are you experiencing any shortness of breath? No    Review with patient via phone medications and showering instructions  She says she don't take any medication  Instructed to avoid all ASA and OTC Vit/Supp prior to surgery and to avoid NSAIDs 3 days prior to surgery per anesthesia instructions  Tylenol ok to take prn  Advised ASC call with surgery schedule time, nothing eat or drink after midnight  Verbalized understanding

## 2023-01-18 ENCOUNTER — APPOINTMENT (OUTPATIENT)
Dept: RADIOLOGY | Facility: HOSPITAL | Age: 38
End: 2023-01-18

## 2023-01-18 ENCOUNTER — ANESTHESIA EVENT (OUTPATIENT)
Dept: PERIOP | Facility: HOSPITAL | Age: 38
End: 2023-01-18

## 2023-01-18 ENCOUNTER — HOSPITAL ENCOUNTER (OUTPATIENT)
Facility: HOSPITAL | Age: 38
Setting detail: OUTPATIENT SURGERY
Discharge: HOME | End: 2023-01-18
Attending: PODIATRIST | Admitting: PODIATRIST

## 2023-01-18 ENCOUNTER — ANESTHESIA (OUTPATIENT)
Dept: PERIOP | Facility: HOSPITAL | Age: 38
End: 2023-01-18

## 2023-01-18 VITALS
DIASTOLIC BLOOD PRESSURE: 81 MMHG | SYSTOLIC BLOOD PRESSURE: 157 MMHG | HEIGHT: 67 IN | HEART RATE: 55 BPM | WEIGHT: 165 LBS | OXYGEN SATURATION: 100 % | TEMPERATURE: 97.3 F | BODY MASS INDEX: 25.9 KG/M2 | RESPIRATION RATE: 16 BRPM

## 2023-01-18 DIAGNOSIS — Z98.890 POSTOPERATIVE STATE: Primary | ICD-10-CM

## 2023-01-18 LAB
EXT PREGNANCY TEST URINE: NEGATIVE
EXT. CONTROL: NORMAL

## 2023-01-18 DEVICE — CANNULATED SCREW
Type: IMPLANTABLE DEVICE | Site: FOOT | Status: FUNCTIONAL
Brand: ASNIS

## 2023-01-18 RX ORDER — SODIUM CHLORIDE, SODIUM LACTATE, POTASSIUM CHLORIDE, CALCIUM CHLORIDE 600; 310; 30; 20 MG/100ML; MG/100ML; MG/100ML; MG/100ML
125 INJECTION, SOLUTION INTRAVENOUS CONTINUOUS
Status: DISCONTINUED | OUTPATIENT
Start: 2023-01-18 | End: 2023-01-18 | Stop reason: HOSPADM

## 2023-01-18 RX ORDER — CEFAZOLIN SODIUM 2 G/50ML
2000 SOLUTION INTRAVENOUS ONCE
Status: COMPLETED | OUTPATIENT
Start: 2023-01-18 | End: 2023-01-18

## 2023-01-18 RX ORDER — ONDANSETRON 2 MG/ML
INJECTION INTRAMUSCULAR; INTRAVENOUS AS NEEDED
Status: DISCONTINUED | OUTPATIENT
Start: 2023-01-18 | End: 2023-01-18

## 2023-01-18 RX ORDER — MAGNESIUM HYDROXIDE 1200 MG/15ML
LIQUID ORAL AS NEEDED
Status: DISCONTINUED | OUTPATIENT
Start: 2023-01-18 | End: 2023-01-18 | Stop reason: HOSPADM

## 2023-01-18 RX ORDER — PROPOFOL 10 MG/ML
INJECTION, EMULSION INTRAVENOUS CONTINUOUS PRN
Status: DISCONTINUED | OUTPATIENT
Start: 2023-01-18 | End: 2023-01-18

## 2023-01-18 RX ORDER — OXYCODONE HYDROCHLORIDE AND ACETAMINOPHEN 5; 325 MG/1; MG/1
1 TABLET ORAL EVERY 4 HOURS PRN
Qty: 25 TABLET | Refills: 0 | Status: SHIPPED | OUTPATIENT
Start: 2023-01-18 | End: 2023-01-28

## 2023-01-18 RX ORDER — PROPOFOL 10 MG/ML
INJECTION, EMULSION INTRAVENOUS AS NEEDED
Status: DISCONTINUED | OUTPATIENT
Start: 2023-01-18 | End: 2023-01-18

## 2023-01-18 RX ORDER — SODIUM CHLORIDE, SODIUM LACTATE, POTASSIUM CHLORIDE, CALCIUM CHLORIDE 600; 310; 30; 20 MG/100ML; MG/100ML; MG/100ML; MG/100ML
INJECTION, SOLUTION INTRAVENOUS CONTINUOUS PRN
Status: DISCONTINUED | OUTPATIENT
Start: 2023-01-18 | End: 2023-01-18

## 2023-01-18 RX ORDER — BUPIVACAINE HYDROCHLORIDE 5 MG/ML
INJECTION, SOLUTION PERINEURAL AS NEEDED
Status: DISCONTINUED | OUTPATIENT
Start: 2023-01-18 | End: 2023-01-18 | Stop reason: HOSPADM

## 2023-01-18 RX ORDER — SENNOSIDES 8.6 MG
650 CAPSULE ORAL EVERY 8 HOURS PRN
Qty: 30 TABLET | Refills: 0 | Status: SHIPPED | OUTPATIENT
Start: 2023-01-18

## 2023-01-18 RX ORDER — LIDOCAINE HYDROCHLORIDE 10 MG/ML
INJECTION, SOLUTION EPIDURAL; INFILTRATION; INTRACAUDAL; PERINEURAL AS NEEDED
Status: DISCONTINUED | OUTPATIENT
Start: 2023-01-18 | End: 2023-01-18

## 2023-01-18 RX ORDER — HYDROMORPHONE HCL/PF 1 MG/ML
0.5 SYRINGE (ML) INJECTION
Status: DISCONTINUED | OUTPATIENT
Start: 2023-01-18 | End: 2023-01-18 | Stop reason: HOSPADM

## 2023-01-18 RX ORDER — FENTANYL CITRATE 50 UG/ML
INJECTION, SOLUTION INTRAMUSCULAR; INTRAVENOUS AS NEEDED
Status: DISCONTINUED | OUTPATIENT
Start: 2023-01-18 | End: 2023-01-18

## 2023-01-18 RX ORDER — SODIUM CHLORIDE, SODIUM LACTATE, POTASSIUM CHLORIDE, CALCIUM CHLORIDE 600; 310; 30; 20 MG/100ML; MG/100ML; MG/100ML; MG/100ML
75 INJECTION, SOLUTION INTRAVENOUS CONTINUOUS
Status: DISCONTINUED | OUTPATIENT
Start: 2023-01-18 | End: 2023-01-18 | Stop reason: HOSPADM

## 2023-01-18 RX ORDER — ONDANSETRON 2 MG/ML
4 INJECTION INTRAMUSCULAR; INTRAVENOUS ONCE AS NEEDED
Status: DISCONTINUED | OUTPATIENT
Start: 2023-01-18 | End: 2023-01-18 | Stop reason: HOSPADM

## 2023-01-18 RX ORDER — IBUPROFEN 600 MG/1
600 TABLET ORAL EVERY 6 HOURS PRN
Qty: 30 TABLET | Refills: 0 | Status: SHIPPED | OUTPATIENT
Start: 2023-01-18

## 2023-01-18 RX ORDER — FENTANYL CITRATE/PF 50 MCG/ML
25 SYRINGE (ML) INJECTION
Status: COMPLETED | OUTPATIENT
Start: 2023-01-18 | End: 2023-01-18

## 2023-01-18 RX ORDER — DEXAMETHASONE SODIUM PHOSPHATE 4 MG/ML
INJECTION, SOLUTION INTRA-ARTICULAR; INTRALESIONAL; INTRAMUSCULAR; INTRAVENOUS; SOFT TISSUE AS NEEDED
Status: DISCONTINUED | OUTPATIENT
Start: 2023-01-18 | End: 2023-01-18

## 2023-01-18 RX ORDER — OXYCODONE HYDROCHLORIDE 5 MG/1
5 TABLET ORAL EVERY 4 HOURS PRN
Status: DISCONTINUED | OUTPATIENT
Start: 2023-01-18 | End: 2023-01-18 | Stop reason: HOSPADM

## 2023-01-18 RX ORDER — MIDAZOLAM HYDROCHLORIDE 2 MG/2ML
INJECTION, SOLUTION INTRAMUSCULAR; INTRAVENOUS AS NEEDED
Status: DISCONTINUED | OUTPATIENT
Start: 2023-01-18 | End: 2023-01-18

## 2023-01-18 RX ORDER — DEXAMETHASONE SODIUM PHOSPHATE 10 MG/ML
INJECTION, SOLUTION INTRAMUSCULAR; INTRAVENOUS AS NEEDED
Status: DISCONTINUED | OUTPATIENT
Start: 2023-01-18 | End: 2023-01-18

## 2023-01-18 RX ORDER — CHLORHEXIDINE GLUCONATE 0.12 MG/ML
15 RINSE ORAL ONCE
Status: COMPLETED | OUTPATIENT
Start: 2023-01-18 | End: 2023-01-18

## 2023-01-18 RX ADMIN — FENTANYL CITRATE 25 MCG: 50 INJECTION, SOLUTION INTRAMUSCULAR; INTRAVENOUS at 15:16

## 2023-01-18 RX ADMIN — FENTANYL CITRATE 25 MCG: 50 INJECTION, SOLUTION INTRAMUSCULAR; INTRAVENOUS at 14:54

## 2023-01-18 RX ADMIN — MIDAZOLAM 2 MG: 1 INJECTION INTRAMUSCULAR; INTRAVENOUS at 13:50

## 2023-01-18 RX ADMIN — ONDANSETRON 4 MG: 2 INJECTION INTRAMUSCULAR; INTRAVENOUS at 14:06

## 2023-01-18 RX ADMIN — PROPOFOL 75 MCG/KG/MIN: 10 INJECTION, EMULSION INTRAVENOUS at 14:01

## 2023-01-18 RX ADMIN — FENTANYL CITRATE 25 MCG: 50 INJECTION, SOLUTION INTRAMUSCULAR; INTRAVENOUS at 15:01

## 2023-01-18 RX ADMIN — CHLORHEXIDINE GLUCONATE 0.12% ORAL RINSE 15 ML: 1.2 LIQUID ORAL at 11:21

## 2023-01-18 RX ADMIN — FENTANYL CITRATE 50 MCG: 50 INJECTION, SOLUTION INTRAMUSCULAR; INTRAVENOUS at 14:05

## 2023-01-18 RX ADMIN — OXYCODONE HYDROCHLORIDE 5 MG: 5 TABLET ORAL at 17:10

## 2023-01-18 RX ADMIN — FENTANYL CITRATE 25 MCG: 50 INJECTION, SOLUTION INTRAMUSCULAR; INTRAVENOUS at 15:11

## 2023-01-18 RX ADMIN — DEXAMETHASONE SODIUM PHOSPHATE 10 MG: 10 INJECTION, SOLUTION INTRAMUSCULAR; INTRAVENOUS at 14:04

## 2023-01-18 RX ADMIN — HYDROMORPHONE HYDROCHLORIDE 0.5 MG: 1 INJECTION, SOLUTION INTRAMUSCULAR; INTRAVENOUS; SUBCUTANEOUS at 15:20

## 2023-01-18 RX ADMIN — PROPOFOL 100 MG: 10 INJECTION, EMULSION INTRAVENOUS at 14:01

## 2023-01-18 RX ADMIN — DEXAMETHASONE SODIUM PHOSPHATE 10 MG: 4 INJECTION, SOLUTION INTRAMUSCULAR; INTRAVENOUS at 14:03

## 2023-01-18 RX ADMIN — SODIUM CHLORIDE, SODIUM LACTATE, POTASSIUM CHLORIDE, AND CALCIUM CHLORIDE: .6; .31; .03; .02 INJECTION, SOLUTION INTRAVENOUS at 13:19

## 2023-01-18 RX ADMIN — CEFAZOLIN SODIUM 2000 MG: 2 SOLUTION INTRAVENOUS at 13:55

## 2023-01-18 RX ADMIN — LIDOCAINE HYDROCHLORIDE 50 MG: 10 INJECTION, SOLUTION EPIDURAL; INFILTRATION; INTRACAUDAL; PERINEURAL at 14:01

## 2023-01-18 RX ADMIN — DEXAMETHASONE SODIUM PHOSPHATE 10 MG: 10 INJECTION, SOLUTION INTRAMUSCULAR; INTRAVENOUS at 13:23

## 2023-01-18 NOTE — ANESTHESIA POSTPROCEDURE EVALUATION
Post-Op Assessment Note    CV Status:  Stable  Pain Score: 0    Pain management: adequate     Mental Status:  Alert and awake   Hydration Status:  Euvolemic   PONV Controlled:  Controlled   Airway Patency:  Patent      Post Op Vitals Reviewed: Yes      Staff: Anesthesiologist, CRNA         No notable events documented      BP   139/88   Temp  98 2   Pulse 58   Resp   14   SpO2   98

## 2023-01-18 NOTE — ANESTHESIA PREPROCEDURE EVALUATION
Procedure:  BUNIONECTOMY (Left: Foot)    Relevant Problems   No relevant active problems      anemia  Physical Exam    Airway    Mallampati score: II  TM Distance: >3 FB  Neck ROM: full     Dental       Cardiovascular  Cardiovascular exam normal    Pulmonary  Pulmonary exam normal     Other Findings        Anesthesia Plan  ASA Score- 2     Anesthesia Type- IV sedation with anesthesia with ASA Monitors  Additional Monitors:   Airway Plan:           Plan Factors-Exercise tolerance (METS): >4 METS  Chart reviewed  Existing labs reviewed  Patient is not a current smoker  Patient not instructed to abstain from smoking on day of procedure  Patient did not smoke on day of surgery  Induction- intravenous  Postoperative Plan-     Informed Consent- Anesthetic plan and risks discussed with patient  I personally reviewed this patient with the CRNA  Discussed and agreed on the Anesthesia Plan with the CRNA  Stephani Mcneil

## 2023-01-18 NOTE — DISCHARGE INSTR - AVS FIRST PAGE
Dr Marlin Tay Instructions    1  Take your prescribed medication as directed  2  Upon arrival at home, lie down and elevate your surgical foot on 2 pillows  3  Stay off your feet as much as possible for the first 24-48 hours  This is when your feet first swell and may become painful  After 48 hours you may begin limited walking following these restrictions:    Partial weight-bearing in surgical shoe  4  Drink large quantities of water  Consume no alcohol  Continue a well-balanced diet  5  Report any unusual discomfort or fever to this office  6  A limited amount of discomfort and swelling is to be expected  In some cases the skin may take on a bruised appearance  The surgical cleansing solution that was applied to your foot prior to the operation is dark in color and the operation site may appear to be oozing when it actually is not  7  A slight amount of blood is to be expected, and is no cause for alarm  Do not remove the dressings  If there is active bleeding and if the bleeding persists, add additional gauze to the bandage, apply direct pressure, elevate your feet and call this office  8  Do not get the dressings wet  As regular bathing may be inconvenient, sponge baths are recommended  9  When anesthesia wears off and if any discomfort should be present, apply an ice pack directly over the operated area for 15 minute intervals for several hours or until the pain leaves  (USE IN EXCESS OF 15 MINUTES COULD CAUSE FROSTBITE)  Do not use hot water bags or electric pads  A convenient icepack can be made by placing ice cubes in a plastic bag and covering this with a towel  10  Take over-the-counter laxative for constipation, this is common with use of narcotic medications

## 2023-01-18 NOTE — OP NOTE
OPERATIVE REPORT - Podiatry  PATIENT NAME: Ashley Sandoval    :  1985  MRN: [id number]  Pt Location:  OR ROOM 03    SURGERY DATE: 2023    Surgeon(s) and Role:     * Ce Braxton DPM - Primary     * Gurpreet Leon DPM - Assisting    Pre-op Diagnosis:  Bunion of left foot [M21 612]    Post-Op Diagnosis Codes:     * Bunion of left foot [M21 612]    Procedure(s) (LRB):  BUNIONECTOMY (Left)    Specimen(s):  * No specimens in log *    Estimated Blood Loss:   Minimal    Drains:  * No LDAs found *    Anesthesia Type:   IV Sedation with Anesthesia with 20ml of 0 5% Bupivacaine   Hemostasis:  Pneumatic ankle tourniquet set at 250 mmHg for 29 mins  Direct compression, electrocautery    Materials:  Implant Name Type Inv  Item Serial No   Lot No  LRB No  Used Action   SCREW  MARY 3 X 16MM ASNIS MICRO - [id number]  SCREW  MARY 3 X 16MM ASNIS MICRO  [employer]  Left 1 Implanted         Injectables:  None    Operative Findings:  Consistent with Diagnosis    Complications:   None    Procedure and Technique:     Under mild sedation, the patient was brought into the operating room and was placed on the operating table in the supine position  IV sedation was achieved by anesthesia team and a universal timeout was performed where all parties are in agreement of correct patient, correct procedure and correct site  A pneumatic tourniquet was then placed over the patient's left lower extremity with ample padding  A regional block was performed with local anesthetic  The foot was then prepped and draped in the usual aseptic manner  An esmarch bandage was used to exsangunate the foot and the pneumatic tourniquet was then inflated to 250 mmHg  Attention drawn to the left foot  Dorsal medial incision was made  Careful dissection preserving neurovascular and tendinous structures  First metatarsophalangeal joint was exposed  Medial eminence of the first metatarsal was resected with sagittal saw  Chevron type osteotomy was made in a medial to lateral direction the distal aspect of the first metatarsal   Capital fragment was shifted laterally  K wire was used for temporary fixation  Screw was then placed over the K wire with good fixation of capital fragment  Surgical site was irrigated with copious amounts sterile saline  Medial capsulotomy was performed  Capsule was closed with Vicryl suture  Skin was then closed with Vicryl and Prolene suture  Foot was cleaned and dried and dressed with Betadine soaked Adaptic, 4 x 4 gauze, Osmani and secured with Coban  The tourniquet was deflated and normal hyperemic response was noted to all digits  The patient tolerated the procedure and anesthesia well without immediate complications and transferred to PACU with vital signs stable  Dr Avel Layne Was present during the entire procedure and participated in all key aspects  SIGNATURE: Nadia Andrews DPM  DATE: January 18, 2023  TIME: 2:48 PM      Portions of the record may have been created with voice recognition software  Occasional wrong word or "sound a like" substitutions may have occurred due to the inherent limitations of voice recognition software  Read the chart carefully and recognize, using context, where substitutions have occurred

## 2023-01-18 NOTE — NURSING NOTE
Pt is awake,alert,tolerated diet, OOB with assistance, surgical wedge shoe on, ambulating with crutches PWB to the left foot  Exposed toes to left foot are pink and warm  Pain medication effective for pain relief  Written and verbal instructions given, pt verbalizes an understanding  Voided QS

## 2023-01-18 NOTE — DISCHARGE SUMMARY
Discharge Summary Outpatient Procedure Podiatry -   Marilynn Gaston 40 y o  female MRN: [id number]  Unit/Bed#: OR ABBY Encounter: [id number]    Admission Date: 1/18/2023     Admitting Diagnosis: Bunion of left foot [M21 612]    Discharge Diagnosis: same    Procedures Performed: BUNIONECTOMY: 40978 (CPT®)    Complications: none    Condition at Discharge: stable    Discharge instructions/Information to patient and family:   See after visit summary for information provided to patient and family  Provisions for Follow-Up Care/Important appointments:  See after visit summary for information related to follow-up care and any pertinent home health orders  Discharge Medications:  See after visit summary for reconciled discharge medications provided to patient and family

## 2024-07-01 ENCOUNTER — APPOINTMENT (OUTPATIENT)
Dept: RADIOLOGY | Facility: MEDICAL CENTER | Age: 39
End: 2024-07-01

## 2024-07-01 ENCOUNTER — APPOINTMENT (OUTPATIENT)
Dept: URGENT CARE | Facility: MEDICAL CENTER | Age: 39
End: 2024-07-01

## 2024-07-01 DIAGNOSIS — Z02.1 ENCOUNTER FOR PRE-EMPLOYMENT HEALTH SCREENING EXAMINATION: Primary | ICD-10-CM

## 2024-07-01 DIAGNOSIS — Z02.1 ENCOUNTER FOR PRE-EMPLOYMENT HEALTH SCREENING EXAMINATION: ICD-10-CM

## 2024-07-01 PROCEDURE — 71045 X-RAY EXAM CHEST 1 VIEW: CPT

## 2024-07-30 ENCOUNTER — OCCMED (OUTPATIENT)
Age: 39
End: 2024-07-30

## 2024-07-30 DIAGNOSIS — Z02.1 PHYSICAL EXAM, PRE-EMPLOYMENT: Primary | ICD-10-CM

## 2024-09-03 ENCOUNTER — HOSPITAL ENCOUNTER (INPATIENT)
Facility: HOSPITAL | Age: 39
LOS: 6 days | Discharge: HOME | DRG: 751 | End: 2024-09-09
Attending: STUDENT IN AN ORGANIZED HEALTH CARE EDUCATION/TRAINING PROGRAM | Admitting: PSYCHIATRY & NEUROLOGY
Payer: COMMERCIAL

## 2024-09-03 ENCOUNTER — HOSPITAL ENCOUNTER (EMERGENCY)
Facility: HOSPITAL | Age: 39
End: 2024-09-03
Attending: EMERGENCY MEDICINE
Payer: MEDICARE

## 2024-09-03 VITALS
RESPIRATION RATE: 18 BRPM | SYSTOLIC BLOOD PRESSURE: 130 MMHG | DIASTOLIC BLOOD PRESSURE: 75 MMHG | HEART RATE: 87 BPM | TEMPERATURE: 97.8 F | BODY MASS INDEX: 25.62 KG/M2 | WEIGHT: 163.58 LBS | OXYGEN SATURATION: 100 %

## 2024-09-03 DIAGNOSIS — F22 DELUSIONS (HCC): ICD-10-CM

## 2024-09-03 DIAGNOSIS — F22 ACUTE PARANOIA (HCC): Primary | ICD-10-CM

## 2024-09-03 DIAGNOSIS — F22 ACUTE PARANOIA (HCC): ICD-10-CM

## 2024-09-03 DIAGNOSIS — F32.3 MDD (MAJOR DEPRESSIVE DISORDER), SINGLE EPISODE, SEVERE WITH PSYCHOTIC FEATURES (HCC): Primary | ICD-10-CM

## 2024-09-03 PROBLEM — Z78.9 KNOWN MEDICAL PROBLEMS: Status: ACTIVE | Noted: 2024-09-03

## 2024-09-03 LAB
AMPHETAMINES SERPL QL SCN: NEGATIVE
BARBITURATES UR QL: NEGATIVE
BENZODIAZ UR QL: NEGATIVE
COCAINE UR QL: NEGATIVE
ETHANOL EXG-MCNC: 0 MG/DL
EXT PREGNANCY TEST URINE: NEGATIVE
EXT. CONTROL: NORMAL
FENTANYL UR QL SCN: NEGATIVE
HYDROCODONE UR QL SCN: NEGATIVE
METHADONE UR QL: NEGATIVE
OPIATES UR QL SCN: NEGATIVE
OXYCODONE+OXYMORPHONE UR QL SCN: NEGATIVE
PCP UR QL: NEGATIVE
THC UR QL: POSITIVE

## 2024-09-03 PROCEDURE — 80307 DRUG TEST PRSMV CHEM ANLYZR: CPT | Performed by: EMERGENCY MEDICINE

## 2024-09-03 PROCEDURE — 81025 URINE PREGNANCY TEST: CPT | Performed by: EMERGENCY MEDICINE

## 2024-09-03 PROCEDURE — 99284 EMERGENCY DEPT VISIT MOD MDM: CPT

## 2024-09-03 PROCEDURE — 82075 ASSAY OF BREATH ETHANOL: CPT | Performed by: EMERGENCY MEDICINE

## 2024-09-03 PROCEDURE — 99285 EMERGENCY DEPT VISIT HI MDM: CPT | Performed by: EMERGENCY MEDICINE

## 2024-09-03 RX ORDER — DIPHENHYDRAMINE HYDROCHLORIDE 50 MG/ML
50 INJECTION INTRAMUSCULAR; INTRAVENOUS EVERY 6 HOURS PRN
Status: DISCONTINUED | OUTPATIENT
Start: 2024-09-03 | End: 2024-09-09 | Stop reason: HOSPADM

## 2024-09-03 RX ORDER — HYDROXYZINE HYDROCHLORIDE 25 MG/1
100 TABLET, FILM COATED ORAL
Status: CANCELLED | OUTPATIENT
Start: 2024-09-03

## 2024-09-03 RX ORDER — QUETIAPINE FUMARATE 100 MG/1
100 TABLET, FILM COATED ORAL
COMMUNITY
End: 2024-09-09

## 2024-09-03 RX ORDER — IBUPROFEN 600 MG/1
600 TABLET, FILM COATED ORAL EVERY 6 HOURS PRN
Status: CANCELLED | OUTPATIENT
Start: 2024-09-03

## 2024-09-03 RX ORDER — IBUPROFEN 400 MG/1
400 TABLET, FILM COATED ORAL EVERY 4 HOURS PRN
Status: DISCONTINUED | OUTPATIENT
Start: 2024-09-03 | End: 2024-09-09 | Stop reason: HOSPADM

## 2024-09-03 RX ORDER — LORAZEPAM 2 MG/ML
1 INJECTION INTRAMUSCULAR
Status: CANCELLED | OUTPATIENT
Start: 2024-09-03

## 2024-09-03 RX ORDER — HALOPERIDOL 5 MG/1
5 TABLET ORAL
Status: DISCONTINUED | OUTPATIENT
Start: 2024-09-03 | End: 2024-09-09 | Stop reason: HOSPADM

## 2024-09-03 RX ORDER — LORAZEPAM 2 MG/ML
2 INJECTION INTRAMUSCULAR
Status: DISCONTINUED | OUTPATIENT
Start: 2024-09-03 | End: 2024-09-09 | Stop reason: HOSPADM

## 2024-09-03 RX ORDER — HALOPERIDOL 5 MG/1
2.5 TABLET ORAL
Status: DISCONTINUED | OUTPATIENT
Start: 2024-09-03 | End: 2024-09-09 | Stop reason: HOSPADM

## 2024-09-03 RX ORDER — POLYETHYLENE GLYCOL 3350 17 G/17G
17 POWDER, FOR SOLUTION ORAL DAILY PRN
Status: CANCELLED | OUTPATIENT
Start: 2024-09-03

## 2024-09-03 RX ORDER — HALOPERIDOL 1 MG/1
1 TABLET ORAL EVERY 6 HOURS PRN
Status: DISCONTINUED | OUTPATIENT
Start: 2024-09-03 | End: 2024-09-09 | Stop reason: HOSPADM

## 2024-09-03 RX ORDER — IBUPROFEN 400 MG/1
800 TABLET, FILM COATED ORAL EVERY 8 HOURS PRN
Status: CANCELLED | OUTPATIENT
Start: 2024-09-03

## 2024-09-03 RX ORDER — PROPRANOLOL HYDROCHLORIDE 20 MG/1
10 TABLET ORAL EVERY 8 HOURS PRN
Status: CANCELLED | OUTPATIENT
Start: 2024-09-03

## 2024-09-03 RX ORDER — BISACODYL 10 MG
10 SUPPOSITORY, RECTAL RECTAL DAILY PRN
Status: CANCELLED | OUTPATIENT
Start: 2024-09-03

## 2024-09-03 RX ORDER — LORAZEPAM 2 MG/ML
2 INJECTION INTRAMUSCULAR EVERY 6 HOURS PRN
Status: CANCELLED | OUTPATIENT
Start: 2024-09-03

## 2024-09-03 RX ORDER — BISACODYL 10 MG
10 SUPPOSITORY, RECTAL RECTAL DAILY PRN
Status: DISCONTINUED | OUTPATIENT
Start: 2024-09-03 | End: 2024-09-09 | Stop reason: HOSPADM

## 2024-09-03 RX ORDER — AMOXICILLIN 250 MG
1 CAPSULE ORAL DAILY PRN
Status: CANCELLED | OUTPATIENT
Start: 2024-09-03

## 2024-09-03 RX ORDER — BENZTROPINE MESYLATE 1 MG/ML
1 INJECTION, SOLUTION INTRAMUSCULAR; INTRAVENOUS
Status: DISCONTINUED | OUTPATIENT
Start: 2024-09-03 | End: 2024-09-09 | Stop reason: HOSPADM

## 2024-09-03 RX ORDER — BENZTROPINE MESYLATE 1 MG/ML
1 INJECTION, SOLUTION INTRAMUSCULAR; INTRAVENOUS
Status: CANCELLED | OUTPATIENT
Start: 2024-09-03

## 2024-09-03 RX ORDER — IBUPROFEN 600 MG/1
600 TABLET, FILM COATED ORAL EVERY 6 HOURS PRN
Status: DISCONTINUED | OUTPATIENT
Start: 2024-09-03 | End: 2024-09-05

## 2024-09-03 RX ORDER — PROPRANOLOL HYDROCHLORIDE 10 MG/1
10 TABLET ORAL EVERY 8 HOURS PRN
Status: DISCONTINUED | OUTPATIENT
Start: 2024-09-03 | End: 2024-09-09 | Stop reason: HOSPADM

## 2024-09-03 RX ORDER — IBUPROFEN 400 MG/1
400 TABLET, FILM COATED ORAL EVERY 4 HOURS PRN
Status: CANCELLED | OUTPATIENT
Start: 2024-09-03

## 2024-09-03 RX ORDER — HALOPERIDOL 5 MG/ML
2.5 INJECTION INTRAMUSCULAR
Status: DISCONTINUED | OUTPATIENT
Start: 2024-09-03 | End: 2024-09-09 | Stop reason: HOSPADM

## 2024-09-03 RX ORDER — HYDROXYZINE HYDROCHLORIDE 50 MG/1
100 TABLET, FILM COATED ORAL
Status: DISCONTINUED | OUTPATIENT
Start: 2024-09-03 | End: 2024-09-09 | Stop reason: HOSPADM

## 2024-09-03 RX ORDER — HYDROXYZINE HYDROCHLORIDE 25 MG/1
25 TABLET, FILM COATED ORAL
Status: CANCELLED | OUTPATIENT
Start: 2024-09-03

## 2024-09-03 RX ORDER — HALOPERIDOL 5 MG/ML
5 INJECTION INTRAMUSCULAR
Status: DISCONTINUED | OUTPATIENT
Start: 2024-09-03 | End: 2024-09-09 | Stop reason: HOSPADM

## 2024-09-03 RX ORDER — BENZTROPINE MESYLATE 1 MG/1
1 TABLET ORAL
Status: CANCELLED | OUTPATIENT
Start: 2024-09-03

## 2024-09-03 RX ORDER — SERTRALINE HYDROCHLORIDE 25 MG/1
25 TABLET, FILM COATED ORAL DAILY
COMMUNITY
End: 2024-09-09

## 2024-09-03 RX ORDER — LANOLIN ALCOHOL/MO/W.PET/CERES
3 CREAM (GRAM) TOPICAL
Status: CANCELLED | OUTPATIENT
Start: 2024-09-03

## 2024-09-03 RX ORDER — TRAZODONE HYDROCHLORIDE 50 MG/1
50 TABLET, FILM COATED ORAL
Status: CANCELLED | OUTPATIENT
Start: 2024-09-03

## 2024-09-03 RX ORDER — AMOXICILLIN 250 MG
1 CAPSULE ORAL DAILY PRN
Status: DISCONTINUED | OUTPATIENT
Start: 2024-09-03 | End: 2024-09-09 | Stop reason: HOSPADM

## 2024-09-03 RX ORDER — LORAZEPAM 2 MG/ML
2 INJECTION INTRAMUSCULAR EVERY 6 HOURS PRN
Status: DISCONTINUED | OUTPATIENT
Start: 2024-09-03 | End: 2024-09-09 | Stop reason: HOSPADM

## 2024-09-03 RX ORDER — HALOPERIDOL 5 MG/ML
5 INJECTION INTRAMUSCULAR
Status: CANCELLED | OUTPATIENT
Start: 2024-09-03

## 2024-09-03 RX ORDER — BENZTROPINE MESYLATE 1 MG/ML
0.5 INJECTION, SOLUTION INTRAMUSCULAR; INTRAVENOUS
Status: CANCELLED | OUTPATIENT
Start: 2024-09-03

## 2024-09-03 RX ORDER — HALOPERIDOL 5 MG/ML
2.5 INJECTION INTRAMUSCULAR
Status: CANCELLED | OUTPATIENT
Start: 2024-09-03

## 2024-09-03 RX ORDER — LORAZEPAM 1 MG/1
1 TABLET ORAL ONCE
Status: COMPLETED | OUTPATIENT
Start: 2024-09-03 | End: 2024-09-03

## 2024-09-03 RX ORDER — LANOLIN ALCOHOL/MO/W.PET/CERES
3 CREAM (GRAM) TOPICAL
Status: DISCONTINUED | OUTPATIENT
Start: 2024-09-03 | End: 2024-09-09 | Stop reason: HOSPADM

## 2024-09-03 RX ORDER — MAGNESIUM HYDROXIDE/ALUMINUM HYDROXICE/SIMETHICONE 120; 1200; 1200 MG/30ML; MG/30ML; MG/30ML
30 SUSPENSION ORAL EVERY 4 HOURS PRN
Status: CANCELLED | OUTPATIENT
Start: 2024-09-03

## 2024-09-03 RX ORDER — HALOPERIDOL 1 MG/1
1 TABLET ORAL EVERY 6 HOURS PRN
Status: CANCELLED | OUTPATIENT
Start: 2024-09-03

## 2024-09-03 RX ORDER — BENZTROPINE MESYLATE 1 MG/ML
0.5 INJECTION, SOLUTION INTRAMUSCULAR; INTRAVENOUS
Status: DISCONTINUED | OUTPATIENT
Start: 2024-09-03 | End: 2024-09-09 | Stop reason: HOSPADM

## 2024-09-03 RX ORDER — LORAZEPAM 2 MG/ML
1 INJECTION INTRAMUSCULAR
Status: DISCONTINUED | OUTPATIENT
Start: 2024-09-03 | End: 2024-09-09 | Stop reason: HOSPADM

## 2024-09-03 RX ORDER — IBUPROFEN 800 MG/1
800 TABLET, FILM COATED ORAL EVERY 8 HOURS PRN
Status: DISCONTINUED | OUTPATIENT
Start: 2024-09-03 | End: 2024-09-09 | Stop reason: HOSPADM

## 2024-09-03 RX ORDER — HALOPERIDOL 5 MG/1
2.5 TABLET ORAL
Status: CANCELLED | OUTPATIENT
Start: 2024-09-03

## 2024-09-03 RX ORDER — MAGNESIUM HYDROXIDE/ALUMINUM HYDROXICE/SIMETHICONE 120; 1200; 1200 MG/30ML; MG/30ML; MG/30ML
30 SUSPENSION ORAL EVERY 4 HOURS PRN
Status: DISCONTINUED | OUTPATIENT
Start: 2024-09-03 | End: 2024-09-09 | Stop reason: HOSPADM

## 2024-09-03 RX ORDER — TRAZODONE HYDROCHLORIDE 50 MG/1
50 TABLET, FILM COATED ORAL
Status: DISCONTINUED | OUTPATIENT
Start: 2024-09-03 | End: 2024-09-09 | Stop reason: HOSPADM

## 2024-09-03 RX ORDER — HYDROXYZINE HYDROCHLORIDE 25 MG/1
50 TABLET, FILM COATED ORAL
Status: CANCELLED | OUTPATIENT
Start: 2024-09-03

## 2024-09-03 RX ORDER — HYDROXYZINE HYDROCHLORIDE 25 MG/1
25 TABLET, FILM COATED ORAL
Status: DISCONTINUED | OUTPATIENT
Start: 2024-09-03 | End: 2024-09-09 | Stop reason: HOSPADM

## 2024-09-03 RX ORDER — HYDROXYZINE HYDROCHLORIDE 10 MG/1
10 TABLET, FILM COATED ORAL EVERY 6 HOURS PRN
COMMUNITY
End: 2024-09-09

## 2024-09-03 RX ORDER — HALOPERIDOL 5 MG/1
5 TABLET ORAL
Status: CANCELLED | OUTPATIENT
Start: 2024-09-03

## 2024-09-03 RX ORDER — POLYETHYLENE GLYCOL 3350 17 G/17G
17 POWDER, FOR SOLUTION ORAL DAILY PRN
Status: DISCONTINUED | OUTPATIENT
Start: 2024-09-03 | End: 2024-09-09 | Stop reason: HOSPADM

## 2024-09-03 RX ORDER — LORAZEPAM 2 MG/ML
2 INJECTION INTRAMUSCULAR
Status: CANCELLED | OUTPATIENT
Start: 2024-09-03

## 2024-09-03 RX ORDER — BENZTROPINE MESYLATE 1 MG/1
1 TABLET ORAL
Status: DISCONTINUED | OUTPATIENT
Start: 2024-09-03 | End: 2024-09-09 | Stop reason: HOSPADM

## 2024-09-03 RX ORDER — HYDROXYZINE HYDROCHLORIDE 50 MG/1
50 TABLET, FILM COATED ORAL
Status: DISCONTINUED | OUTPATIENT
Start: 2024-09-03 | End: 2024-09-09 | Stop reason: HOSPADM

## 2024-09-03 RX ORDER — DIPHENHYDRAMINE HYDROCHLORIDE 50 MG/ML
50 INJECTION INTRAMUSCULAR; INTRAVENOUS EVERY 6 HOURS PRN
Status: CANCELLED | OUTPATIENT
Start: 2024-09-03

## 2024-09-03 RX ADMIN — TRAZODONE HYDROCHLORIDE 50 MG: 50 TABLET ORAL at 23:20

## 2024-09-03 RX ADMIN — Medication 3 MG: at 23:20

## 2024-09-03 RX ADMIN — LORAZEPAM 1 MG: 1 TABLET ORAL at 19:18

## 2024-09-03 NOTE — EMTALA/ACUTE CARE TRANSFER
[facility] EMERGENCY DEPARTMENT  [address] [address]  Dept: [phone]      EMTALA TRANSFER CONSENT    NAME Tiff WISEMAN 1985                              MRN [id number]    I have been informed of my rights regarding examination, treatment, and transfer   by Dr. Chilango Guadalupe MD    Benefits: Specialized equipment and/or services available at the receiving facility (Include comment)________________________ (Psychiatry)    Risks: Other: (Include comment)__________________________ (Traffic Accident)      Transfer Request   I acknowledge that my medical condition has been evaluated and explained to me by the emergency department physician or other qualified medical person and/or my attending physician who has recommended and offered to me further medical examination and treatment. I understand the Hospital's obligation with respect to the treatment and stabilization of my emergency medical condition. I nevertheless request to be transferred. I release the Hospital, the doctor, and any other persons caring for me from all responsibility or liability for any injury or ill effects that may result from my transfer and agree to accept all responsibility for the consequences of my choice to transfer, rather than receive stabilizing treatment at the Hospital. I understand that because the transfer is my request, my insurance may not provide reimbursement for the services.  The Hospital will assist and direct me and my family in how to make arrangements for transfer, but the hospital is not liable for any fees charged by the transport service.  In spite of this understanding, I refuse to consent to further medical examination and treatment which has been offered to me, and request transfer to Accepting Facility Name, City & State : [facility]; Amina BAUTISTA. I authorize the performance of emergency medical procedures and treatments  upon me in both transit and upon arrival at the receiving facility.  Additionally, I authorize the release of any and all medical records to the receiving facility and request they be transported with me, if possible.    I authorize the performance of emergency medical procedures and treatments upon me in both transit and upon arrival at the receiving facility.  Additionally, I authorize the release of any and all medical records to the receiving facility and request they be transported with me, if possible.  I understand that the safest mode of transportation during a medical emergency is an ambulance and that the Hospital advocates the use of this mode of transport. Risks of traveling to the receiving facility by car, including absence of medical control, life sustaining equipment, such as oxygen, and medical personnel has been explained to me and I fully understand them.    (ANTONIO CORRECT BOX BELOW)  [  ]  I consent to the stated transfer and to be transported by ambulance/helicopter.  [  ]  I consent to the stated transfer, but refuse transportation by ambulance and accept full responsibility for my transportation by car.  I understand the risks of non-ambulance transfers and I exonerate the Hospital and its staff from any deterioration in my condition that results from this refusal.    X___________________________________________    DATE  24  TIME________  Signature of patient or legally responsible individual signing on patient behalf           RELATIONSHIP TO PATIENT_________________________          Provider Certification    NAME Tiff Hardin                                        [facility] 1985                              MRN [id number]    A medical screening exam was performed on the above named patient.  Based on the examination:    Condition Necessitating Transfer The primary encounter diagnosis was Acute paranoia (HCC). A diagnosis of Delusions (HCC) was also pertinent to this visit.    Patient  Condition: The patient has been stabilized such that within reasonable medical probability, no material deterioration of the patient condition or the condition of the unborn child(jovani) is likely to result from the transfer    Reason for Transfer: Level of Care needed not available at this facility    Transfer Requirements: [address]   Space available and qualified personnel available for treatment as acknowledged by Gerald [phone]  Agreed to accept transfer and to provide appropriate medical treatment as acknowledged by       Dr. Hess  Appropriate medical records of the examination and treatment of the patient are provided at the time of transfer   STAFF INITIAL WHEN COMPLETED _______  Transfer will be performed by qualified personnel from [facility]  and appropriate transfer equipment as required, including the use of necessary and appropriate life support measures.    Provider Certification: I have examined the patient and explained the following risks and benefits of being transferred/refusing transfer to the patient/family:  The patient is stable for psychiatric transfer because they are medically stable, and is protected from harming him/herself or others during transport      Based on these reasonable risks and benefits to the patient and/or the unborn child(jovani), and based upon the information available at the time of the patient’s examination, I certify that the medical benefits reasonably to be expected from the provision of appropriate medical treatments at another medical facility outweigh the increasing risks, if any, to the individual’s medical condition, and in the case of labor to the unborn child, from effecting the transfer.    X____________________________________________ DATE 09/03/24        TIME_______      ORIGINAL - SEND TO MEDICAL RECORDS   COPY - SEND WITH PATIENT DURING TRANSFER

## 2024-09-03 NOTE — ED NOTES
Roundtrip initiated at this time.    [facility] P/U 6085    Transport packet complete.    Patient aware of transport time.    Shamar Craig  Crisis Intervention Specialist II  09/03/24
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
## 2024-09-03 NOTE — ED NOTES
201 Signed and Dated- Rights explained, bed search explained- Faxed to  Intake- Original on patient chart.     Patient is requesting placement outside of [place] at this time, is aware that other [facility]'s locations are [place] and [place]. Patient is amenable to these options, and understands that placement depends on bed availability.    Shamar Craig  Crisis Intervention Specialist II  09/03/24

## 2024-09-03 NOTE — ED NOTES
Insurance Authorization for Admission:  Phone Call Placed to [facility].  Phone Number [phone].  Spoke to Ashlyn.  5 Days Approved.  Level of Care AIP- 201.  Review on TBD.  Authorization #Accepting facility to call upon admission.    EVS (Eligibility Verification System) Called- [phone]  Automated System Ind[facility]    Shamar Craig  Crisis Intervention Specialist II  09/03/24
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
## 2024-09-03 NOTE — ED NOTES
Patient is accepted at [facility].  Patient is accepted by Dr. Jimena Wilson in Admissions.    Transportation is arranged with [facility].  Transportation is scheduled for 2130.  Patient may go to the floor at 2130.      Nurse report is to be called to [phone] prior to patient transfer.     Shamar Craig  Crisis Intervention Specialist II  09/03/24
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
## 2024-09-04 PROBLEM — F32.3 MAJOR DEPRESSIVE DISORDER WITH PSYCHOTIC FEATURES (HCC): Status: RESOLVED | Noted: 2024-09-04 | Resolved: 2024-09-04

## 2024-09-04 PROBLEM — F41.0 GENERALIZED ANXIETY DISORDER WITH PANIC ATTACKS: Status: ACTIVE | Noted: 2024-09-04

## 2024-09-04 PROBLEM — F41.9 ANXIETY: Status: ACTIVE | Noted: 2024-09-04

## 2024-09-04 PROBLEM — F39 MOOD DISORDER (HCC): Status: ACTIVE | Noted: 2024-09-04

## 2024-09-04 PROBLEM — F32.9 MAJOR DEPRESSIVE DISORDER: Status: ACTIVE | Noted: 2024-09-04

## 2024-09-04 PROBLEM — F41.1 GENERALIZED ANXIETY DISORDER WITH PANIC ATTACKS: Status: ACTIVE | Noted: 2024-09-04

## 2024-09-04 PROBLEM — F33.9 RECURRENT MAJOR DEPRESSIVE DISORDER (HCC): Status: ACTIVE | Noted: 2024-09-04

## 2024-09-04 PROBLEM — Z00.8 MEDICAL CLEARANCE FOR PSYCHIATRIC ADMISSION: Status: ACTIVE | Noted: 2024-09-04

## 2024-09-04 PROBLEM — F32.3 MAJOR DEPRESSIVE DISORDER WITH PSYCHOTIC FEATURES (HCC): Status: ACTIVE | Noted: 2024-09-04

## 2024-09-04 LAB
25(OH)D3 SERPL-MCNC: 12.7 NG/ML (ref 30–100)
ALBUMIN SERPL BCG-MCNC: 4.5 G/DL (ref 3.5–5)
ALP SERPL-CCNC: 58 U/L (ref 34–104)
ALT SERPL W P-5'-P-CCNC: 17 U/L (ref 7–52)
ANION GAP SERPL CALCULATED.3IONS-SCNC: 9 MMOL/L (ref 4–13)
AST SERPL W P-5'-P-CCNC: 34 U/L (ref 13–39)
BASOPHILS # BLD AUTO: 0.04 THOUSANDS/ÂΜL (ref 0–0.1)
BASOPHILS NFR BLD AUTO: 1 % (ref 0–1)
BILIRUB SERPL-MCNC: 1.33 MG/DL (ref 0.2–1)
BUN SERPL-MCNC: 8 MG/DL (ref 5–25)
CALCIUM SERPL-MCNC: 9.3 MG/DL (ref 8.4–10.2)
CHLORIDE SERPL-SCNC: 100 MMOL/L (ref 96–108)
CHOLEST SERPL-MCNC: 158 MG/DL
CO2 SERPL-SCNC: 26 MMOL/L (ref 21–32)
CREAT SERPL-MCNC: 0.64 MG/DL (ref 0.6–1.3)
EOSINOPHIL # BLD AUTO: 0.03 THOUSAND/ÂΜL (ref 0–0.61)
EOSINOPHIL NFR BLD AUTO: 1 % (ref 0–6)
ERYTHROCYTE [DISTWIDTH] IN BLOOD BY AUTOMATED COUNT: 12.9 % (ref 11.6–15.1)
FOLATE SERPL-MCNC: 19.3 NG/ML
GFR SERPL CREATININE-BSD FRML MDRD: 112 ML/MIN/1.73SQ M
GLUCOSE P FAST SERPL-MCNC: 73 MG/DL (ref 65–99)
GLUCOSE SERPL-MCNC: 73 MG/DL (ref 65–140)
HCG SERPL QL: NEGATIVE
HCT VFR BLD AUTO: 40.4 % (ref 34.8–46.1)
HDLC SERPL-MCNC: 56 MG/DL
HGB BLD-MCNC: 13.1 G/DL (ref 11.5–15.4)
IMM GRANULOCYTES # BLD AUTO: 0.02 THOUSAND/UL (ref 0–0.2)
IMM GRANULOCYTES NFR BLD AUTO: 0 % (ref 0–2)
LDLC SERPL CALC-MCNC: 82 MG/DL (ref 0–100)
LYMPHOCYTES # BLD AUTO: 1.66 THOUSANDS/ÂΜL (ref 0.6–4.47)
LYMPHOCYTES NFR BLD AUTO: 33 % (ref 14–44)
MCH RBC QN AUTO: 29.3 PG (ref 26.8–34.3)
MCHC RBC AUTO-ENTMCNC: 32.4 G/DL (ref 31.4–37.4)
MCV RBC AUTO: 90 FL (ref 82–98)
MONOCYTES # BLD AUTO: 0.42 THOUSAND/ÂΜL (ref 0.17–1.22)
MONOCYTES NFR BLD AUTO: 8 % (ref 4–12)
NEUTROPHILS # BLD AUTO: 2.82 THOUSANDS/ÂΜL (ref 1.85–7.62)
NEUTS SEG NFR BLD AUTO: 57 % (ref 43–75)
NONHDLC SERPL-MCNC: 102 MG/DL
NRBC BLD AUTO-RTO: 0 /100 WBCS
PLATELET # BLD AUTO: 274 THOUSANDS/UL (ref 149–390)
PMV BLD AUTO: 10.2 FL (ref 8.9–12.7)
POTASSIUM SERPL-SCNC: 4 MMOL/L (ref 3.5–5.3)
PROT SERPL-MCNC: 7.6 G/DL (ref 6.4–8.4)
RBC # BLD AUTO: 4.47 MILLION/UL (ref 3.81–5.12)
SODIUM SERPL-SCNC: 135 MMOL/L (ref 135–147)
TREPONEMA PALLIDUM IGG+IGM AB [PRESENCE] IN SERUM OR PLASMA BY IMMUNOASSAY: NORMAL
TRIGL SERPL-MCNC: 100 MG/DL
TSH SERPL DL<=0.05 MIU/L-ACNC: 2.53 UIU/ML (ref 0.45–4.5)
VIT B12 SERPL-MCNC: 172 PG/ML (ref 180–914)
WBC # BLD AUTO: 4.99 THOUSAND/UL (ref 4.31–10.16)

## 2024-09-04 PROCEDURE — 85025 COMPLETE CBC W/AUTO DIFF WBC: CPT | Performed by: PSYCHIATRY & NEUROLOGY

## 2024-09-04 PROCEDURE — 84703 CHORIONIC GONADOTROPIN ASSAY: CPT | Performed by: PSYCHIATRY & NEUROLOGY

## 2024-09-04 PROCEDURE — 93005 ELECTROCARDIOGRAM TRACING: CPT

## 2024-09-04 PROCEDURE — 86780 TREPONEMA PALLIDUM: CPT | Performed by: PSYCHIATRY & NEUROLOGY

## 2024-09-04 PROCEDURE — 82746 ASSAY OF FOLIC ACID SERUM: CPT | Performed by: PSYCHIATRY & NEUROLOGY

## 2024-09-04 PROCEDURE — 80053 COMPREHEN METABOLIC PANEL: CPT | Performed by: PSYCHIATRY & NEUROLOGY

## 2024-09-04 PROCEDURE — 82306 VITAMIN D 25 HYDROXY: CPT | Performed by: PSYCHIATRY & NEUROLOGY

## 2024-09-04 PROCEDURE — 82607 VITAMIN B-12: CPT | Performed by: PSYCHIATRY & NEUROLOGY

## 2024-09-04 PROCEDURE — 99223 1ST HOSP IP/OBS HIGH 75: CPT | Performed by: STUDENT IN AN ORGANIZED HEALTH CARE EDUCATION/TRAINING PROGRAM

## 2024-09-04 PROCEDURE — 80061 LIPID PANEL: CPT | Performed by: PSYCHIATRY & NEUROLOGY

## 2024-09-04 PROCEDURE — 99253 IP/OBS CNSLTJ NEW/EST LOW 45: CPT | Performed by: PHYSICIAN ASSISTANT

## 2024-09-04 PROCEDURE — 84443 ASSAY THYROID STIM HORMONE: CPT | Performed by: PSYCHIATRY & NEUROLOGY

## 2024-09-04 RX ORDER — QUETIAPINE FUMARATE 100 MG/1
100 TABLET, FILM COATED ORAL
Status: DISCONTINUED | OUTPATIENT
Start: 2024-09-04 | End: 2024-09-05

## 2024-09-04 RX ADMIN — HYDROXYZINE HYDROCHLORIDE 50 MG: 50 TABLET, FILM COATED ORAL at 10:00

## 2024-09-04 RX ADMIN — QUETIAPINE FUMARATE 100 MG: 100 TABLET ORAL at 22:00

## 2024-09-04 RX ADMIN — Medication 3 MG: at 22:00

## 2024-09-04 RX ADMIN — SENNOSIDES AND DOCUSATE SODIUM 1 TABLET: 50; 8.6 TABLET ORAL at 15:03

## 2024-09-04 RX ADMIN — HALOPERIDOL 2.5 MG: 5 TABLET ORAL at 10:00

## 2024-09-04 NOTE — ASSESSMENT & PLAN NOTE
Patient is medically stable to continue inpatient psychiatric treatment.  Contact [facility] with any questions or concerns.

## 2024-09-04 NOTE — PROGRESS NOTES
Diagnosis of Recurrent major depressive disorder reviewed.   Short term goals for decrease in depressive symptoms, decrease in anxiety symptoms, decrease in paranoid thoughts, improvement in insight, improvement in self care, sleep improvement, improvement in appetite, mood stabilization discussed.   All parties in agreement and treatment plan signed.    09/06/24 1120   Team Meeting   Meeting Type Tx Team Meeting   Team Members Present   Team Members Present Physician;Nurse;   Physician Team Member Dr. Amaro   Nursing Team Member [facility]   Care Management Team Member Mara   Patient/Family Present   Patient Present No (pt declined)   Patient's Family Present No

## 2024-09-04 NOTE — NURSING NOTE
"Bedside:   White tshirt Jeans    Bin: Bladimir rodriguez  White/Black Gio Velazquez bag containing:  Earrings in specimen cup  Two notebooks  Three black phones  Blue wallet:  Passport, Birth certificate, SS Card, of a \"Ann Arechiga\"  Passport, [place] State ID, SS card, Marriage certificate, Certificate of Naturalization, Check  Black wallet:  Airpod case with one airpod  EBT card, Express Card, Bjs card (x2), Visa card (x2), Debit card, [employer] card,   Car Key (honda), Two lipsticks, some change    Security: $517.00    Medication with nurse/at nurses station  "

## 2024-09-04 NOTE — NURSING NOTE
"PRN Haldol and Atarax effective. Pt appears more relaxed.  States, \"I feel a lot more calm, and obviously feel better now that I know my son is okay.\" Pt currently walking calmly in the halls talking w/ a peer. No mention of paranoid thoughts on assessment.. Will cont to monitor and offer support as needed.   "

## 2024-09-04 NOTE — ED NOTES
Pt belongings, pharmacy bag [id number], and security bag with cash given to special delivery mobility. Rk Hankins RN  09/03/24 7380
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
## 2024-09-04 NOTE — NURSING NOTE
Patient requested Trazodone to help facilitate sleep. Patient was medicated as ordered with PRN Trazodone 50mg PO for insomnia at 2320.     Afterwards, patient settled in room in preparation for sleep. Later seen asleep in bedroom

## 2024-09-04 NOTE — PLAN OF CARE
Problem: Ineffective Coping  Goal: Cooperates with admission process  Description: Interventions:   - Complete admission process  Outcome: Completed
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
## 2024-09-04 NOTE — PLAN OF CARE
Problem: Alteration in Thoughts and Perception  Goal: Treatment Goal: Gain control of psychotic behaviors/thinking, reduce/eliminate presenting symptoms and demonstrate improved reality functioning upon discharge  Outcome: Progressing  Goal: Verbalize thoughts and feelings  Description: Interventions:  - Promote a nonjudgmental and trusting relationship with the patient through active listening and therapeutic communication  - Assess patient's level of functioning, behavior and potential for risk  - Engage patient in 1 on 1 interactions  - Encourage patient to express fears, feelings, frustrations, and discuss symptoms    - [place] patient to reality, help patient recognize reality-based thinking   - Administer medications as ordered and assess for potential side effects  - Provide the patient education related to the signs and symptoms of the illness and desired effects of prescribed medications  Outcome: Progressing  Goal: Refrain from acting on delusional thinking/internal stimuli  Description: Interventions:  - Monitor patient closely, per order   - Utilize least restrictive measures   - Set reasonable limits, give positive feedback for acceptable   - Administer medications as ordered and monitor of potential side effects  Outcome: Progressing  Goal: Agree to be compliant with medication regime, as prescribed and report medication side effects  Description: Interventions:  - Offer appropriate PRN medication and supervise ingestion; conduct AIMS, as needed   Outcome: Progressing  Goal: Attend and participate in unit activities, including therapeutic, recreational, and educational groups  Description: Interventions:  -Encourage Visitation and family involvement in care  Outcome: Progressing  Goal: Recognize dysfunctional thoughts, communicate reality-based thoughts at the time of discharge  Description: Interventions:  - Provide medication and psycho-education to assist patient in compliance and developing  insight into his/her illness   Outcome: Progressing  Goal: Complete daily ADLs, including personal hygiene independently, as able  Description: Interventions:  - Observe, teach, and assist patient with ADLS  - Monitor and promote a balance of rest/activity, with adequate nutrition and elimination   Outcome: Progressing     Problem: Anxiety  Goal: Anxiety is at manageable level  Description: Interventions:  - Assess and monitor patient's anxiety level.   - Monitor for signs and symptoms (heart palpitations, chest pain, shortness of breath, headaches, nausea, feeling jumpy, restlessness, irritable, apprehensive).   - Collaborate with interdisciplinary team and initiate plan and interventions as ordered.  - [place] patient to unit/surroundings  - Explain treatment plan  - Encourage participation in care  - Encourage verbalization of concerns/fears  - Identify coping mechanisms  - Assist in developing anxiety-reducing skills  - Administer/offer alternative therapies  - Limit or eliminate stimulants  Outcome: Progressing     Problem: Alteration in Orientation  Goal: Treatment Goal: Demonstrate a reduction of confusion and improved orientation to person, place, time and/or situation upon discharge, according to optimum baseline/ability  Outcome: Progressing  Goal: Interact with staff daily  Description: Interventions:  - Assess and re-assess patient's level of orientation  - Engage patient in 1 on 1 interactions, daily, for a minimum of 15 minutes   - Establish rapport/trust with patient   Outcome: Progressing  Goal: Express concerns related to confused thinking related to:  Description: Interventions:  - Encourage patient to express feelings, fears, frustrations, hopes  - Assign consistent caregivers   - [place]/re-orient patient as needed  - Allow comfort items, as appropriate  - Provide visual cues, signs, etc.   Outcome: Progressing  Goal: Allow medical examinations, as recommended  Description: Interventions:  -  Provide physical/neurological exams and/or referrals, per provider   Outcome: Progressing  Goal: Cooperate with recommended testing/procedures  Description: Interventions:  - Determine need for ancillary testing  - Observe for mental status changes  - Implement falls/precaution protocol   Outcome: Progressing  Goal: Attend and participate in unit activities, including therapeutic, recreational, and educational groups  Description: Interventions:  - Provide therapeutic and educational activities daily, encourage attendance and participation, and document same in the medical record   - Provide appropriate opportunities for reminiscence   - Provide a consistent daily routine   - Encourage family contact/visitation   Outcome: Progressing  Goal: Complete daily ADLs, including personal hygiene independently, as able  Description: Interventions:  - Observe, teach, and assist patient with ADLS  Outcome: Progressing

## 2024-09-04 NOTE — CONSULTS
[facility]  Consult  Name: Tiff Hardin 39 y.o. female I MRN: [id number]  Unit/Bed#: -01 I Date of Admission: 9/3/2024   Date of Service: 9/4/2024  Hospital Day: 1    Inpatient consult for Medical Clearance for  patient  Consult performed by: Kwasi Mao PA-C  Consult ordered by: Shira Hess MD          Assessment & Plan   Major depressive disorder  Assessment & Plan  With history of major depressive disorder  Management per primary psychiatry team  Was on Seroquel, Zoloft at home    Medical clearance for psychiatric admission  Assessment & Plan  Patient is medically stable to continue inpatient psychiatric treatment.  Contact SL with any questions or concerns.    * Anxiety  Assessment & Plan  With anxiety, paranoia  Management per psychiatry team         Counseling / Coordination of Care Time: 30 minutes.  Greater than 50% of total time spent on patient counseling and coordination of care.      History of Present Illness:    Tiff Hardin is a 39 y.o. female who is originally admitted to the Psychiatry service due to MDD/anxiety. We are consulted for medical clearance. Patient should continue all prior to admission medications as prescribed by primary care provider/outpatient specialists.   Available admission lab work and vitals are acceptable.  Patient feels a baseline physical health.  Patient appears medically stable for inpatient psychiatric treatment at this time. Please contact SL with any medical questions or concerns if issues should arise.     Review of Systems:    ROS unable to be preformed due to psychiatric disorder.    Past Medical and Surgical History:     Past Medical History:   Diagnosis Date    Anemia     Anxiety        Past Surgical History:   Procedure Laterality Date    GASTRIC BYPASS      HYSTERECTOMY      LAPAROSCOPY      NE CORRJ HLX VLGS BNCTY SESMDC DSTL METAR OSTEOT Right 12/5/2022    Procedure: BUNIONECTOMY;  Surgeon: Louie  WOLF Peralta;  Location:  MAIN OR;  Service: Podiatry    AR CORRJ HLX VLGS BNCTY SESMDC DSTL METAR OSTEOT Left 1/18/2023    Procedure: BUNIONECTOMY;  Surgeon: Louie Peralta DPM;  Location:  MAIN OR;  Service: Podiatry    REDUCTION MAMMAPLASTY         Meds/Allergies:    all medications and allergies reviewed    Allergies:   Allergies   Allergen Reactions    Pollen Extract Sneezing       Social History:     Marital Status: Single    Substance Use History:   Social History     Substance and Sexual Activity   Alcohol Use Yes    Comment: occasional once a month     Social History     Tobacco Use   Smoking Status Never    Passive exposure: Never   Smokeless Tobacco Never     Social History     Substance and Sexual Activity   Drug Use Never       Family History:    non-contributory    Physical Exam:     Vitals:   Blood Pressure: 130/86 (09/04/24 0708)  Pulse: 62 (09/04/24 0708)  Temperature: (!) 97.1 °F (36.2 °C) (09/04/24 0708)  Temp Source: Tympanic (09/04/24 0708)  Respirations: 18 (09/04/24 0708)  SpO2: 100 % (09/04/24 0708)    Physical Exam  Constitutional:       General: She is not in acute distress.     Appearance: Normal appearance.   HENT:      Head: Normocephalic.      Nose: Nose normal.      Mouth/Throat:      Mouth: Mucous membranes are moist.      Pharynx: Oropharynx is clear.   Eyes:      General: No scleral icterus.     Extraocular Movements: Extraocular movements intact.      Conjunctiva/sclera: Conjunctivae normal.      Pupils: Pupils are equal, round, and reactive to light.   Cardiovascular:      Rate and Rhythm: Normal rate.      Heart sounds: No murmur heard.     No friction rub. No gallop.   Pulmonary:      Effort: Pulmonary effort is normal. No respiratory distress.      Breath sounds: Normal breath sounds. No stridor. No wheezing, rhonchi or rales.   Abdominal:      General: Bowel sounds are normal. There is no distension.      Palpations: Abdomen is soft.      Tenderness: There is no abdominal  "tenderness. There is no guarding.   Musculoskeletal:         General: No deformity. Normal range of motion.      Cervical back: Neck supple.      Right lower leg: No edema.      Left lower leg: No edema.   Skin:     General: Skin is warm and dry.      Coloration: Skin is not jaundiced.   Neurological:      General: No focal deficit present.      Mental Status: She is alert and oriented to person, place, and time. Mental status is at baseline.      Cranial Nerves: Cranial nerves 2-12 are intact. No cranial nerve deficit.   Psychiatric:         Thought Content: Thought content is paranoid.         Additional Data:     Lab Results: I have personally reviewed pertinent reports.      Results from last 7 days   Lab Units 09/04/24  0559   WBC Thousand/uL 4.99   HEMOGLOBIN g/dL 13.1   HEMATOCRIT % 40.4   PLATELETS Thousands/uL 274   SEGS PCT % 57   LYMPHO PCT % 33   MONO PCT % 8   EOS PCT % 1     Results from last 7 days   Lab Units 09/04/24  0559   SODIUM mmol/L 135   POTASSIUM mmol/L 4.0   CHLORIDE mmol/L 100   CO2 mmol/L 26   BUN mg/dL 8   CREATININE mg/dL 0.64   ANION GAP mmol/L 9   CALCIUM mg/dL 9.3   ALBUMIN g/dL 4.5   TOTAL BILIRUBIN mg/dL 1.33*   ALK PHOS U/L 58   ALT U/L 17   AST U/L 34   GLUCOSE RANDOM mg/dL 73             No results found for: \"HGBA1C\"            Imaging: I have personally reviewed pertinent reports.      No orders to display       EKG, Pathology, and Other Studies Reviewed on Admission:   Prior pertinent studies and records reviewed in [facility]/Care Everywhere.    ** Please Note: This note has been constructed using a voice recognition system. **  "

## 2024-09-04 NOTE — NURSING NOTE
RN will meet with patient at least twice per day to assess for any concerns, teach about prescribed medications and diagnosis. Patient will be taught and encouraged to utilize healthy coping skills.

## 2024-09-04 NOTE — TREATMENT PLAN
TREATMENT PLAN REVIEW - Behavioral Health Tiff Hardin 39 y.o. 1985 female MRN: [id number]    [facility] QU IP BEHAVIORAL HLTH Room / Bed: [facility] 208/[facility] 208-01 Encounter: [id number]          Admit Date/Time:  9/3/2024 10:26 PM    Treatment Team:   MD Noelle Jimenez, JORGE Gar, JAMES Hobbs, JORGE Taylor, JORGE Mills, THO Gutierrez RN    Diagnosis: Principal Problem:    Recurrent major depressive disorder (HCC)  Active Problems:    Anxiety    Medical clearance for psychiatric admission    Generalized anxiety disorder with panic attacks    Mood disorder (HCC)      Patient Strengths/Assets: cooperative, motivation for treatment/growth, patient is on a voluntary commitment    Patient Barriers/Limitations: financial instability, limited support system, marital/family conflict    Short Term Goals: decrease in depressive symptoms, decrease in anxiety symptoms, decrease in paranoid thoughts, improvement in insight, improvement in self care, sleep improvement, improvement in appetite, mood stabilization    Long Term Goals: improvement in anxiety, resolution of depressive symptoms, stabilization of mood, free of suicidal thoughts, improved insight, acceptance of need for psychiatric medications, acceptance of need for psychiatric treatment, adequate self care, adequate sleep, adequate appetite    Progress Towards Goals: starting psychiatric medications as prescribed, improving, attends groups, mood is stabilizing, less anxious, less depressed    Recommended Treatment: medication management, patient medication education, group therapy, milieu therapy, continued Behavioral Health psychiatric evaluation/assessment process    Treatment Frequency: daily medication monitoring, group and milieu therapy daily, monitoring  through interdisciplinary rounds, monitoring through weekly patient care conferences    Expected Discharge Date:  5-7 days    Discharge Plan: referral for outpatient medication management with a psychiatrist, referral for outpatient psychotherapy    Treatment Plan Created/Updated By: Emelyn Amaro MD

## 2024-09-04 NOTE — ASSESSMENT & PLAN NOTE
With history of major depressive disorder  Management per primary psychiatry team  Was on Seroquel, Zoloft at home

## 2024-09-04 NOTE — CMS CERTIFICATION NOTE
Recertification: Based upon physical, mental and social evaluations, I certify that inpatient psychiatric services continue to be medically necessary for this patient for a duration of 7 midnights for the treatment of  Recurrent major depressive disorder (HCC) Available alternative community resources still do not meet the patient's mental health care needs. I further attest that an established written individualized plan of care has been updated and is outlined in the patient's medical records.

## 2024-09-04 NOTE — CASE MANAGEMENT
"CM called [facility] [facility] ([phone]) to see if they can confirm if pt's 14 year old son is at their Shelter. CM spoke to Stephanie and she confirmed that \" he just arrived and he is at school.\" CM asked if CM can provide pt with their number for her to call later today to speak to shelter staff to confirm her son is safe. Stephanie reported that would be fine.     10:30am  CM met with pt to check in and let her know CM spoke to [employer] [employer] [facility] and confirmed her son is safe and at school today. Pt reported nurse assisted her with school number and pt reported she spoke to school a few minutes ago as well. Pt thankful to staff for the help. .CM provided her with [facility]'s number and let her know she can call later to talk to shelter staff.   "

## 2024-09-05 PROBLEM — F31.81 BIPOLAR II DISORDER (HCC): Status: ACTIVE | Noted: 2024-09-04

## 2024-09-05 PROBLEM — F12.90 CANNABIS USE DISORDER: Status: ACTIVE | Noted: 2024-09-05

## 2024-09-05 LAB
BACTERIA UR QL AUTO: ABNORMAL /HPF
BILIRUB UR QL STRIP: NEGATIVE
CLARITY UR: CLEAR
COLOR UR: YELLOW
GLUCOSE UR STRIP-MCNC: NEGATIVE MG/DL
HGB UR QL STRIP.AUTO: NEGATIVE
KETONES UR STRIP-MCNC: ABNORMAL MG/DL
LEUKOCYTE ESTERASE UR QL STRIP: NEGATIVE
MUCOUS THREADS UR QL AUTO: ABNORMAL
NITRITE UR QL STRIP: NEGATIVE
NON-SQ EPI CELLS URNS QL MICRO: ABNORMAL /HPF
PH UR STRIP.AUTO: 6 [PH]
PROT UR STRIP-MCNC: ABNORMAL MG/DL
RBC #/AREA URNS AUTO: ABNORMAL /HPF
SP GR UR STRIP.AUTO: >=1.03 (ref 1–1.03)
UROBILINOGEN UR STRIP-ACNC: <2 MG/DL
WBC #/AREA URNS AUTO: ABNORMAL /HPF

## 2024-09-05 PROCEDURE — 99232 SBSQ HOSP IP/OBS MODERATE 35: CPT | Performed by: STUDENT IN AN ORGANIZED HEALTH CARE EDUCATION/TRAINING PROGRAM

## 2024-09-05 PROCEDURE — 81001 URINALYSIS AUTO W/SCOPE: CPT | Performed by: PSYCHIATRY & NEUROLOGY

## 2024-09-05 RX ORDER — CLONAZEPAM 0.5 MG/1
0.5 TABLET ORAL 2 TIMES DAILY
Status: DISCONTINUED | OUTPATIENT
Start: 2024-09-05 | End: 2024-09-06

## 2024-09-05 RX ORDER — ACETAMINOPHEN 325 MG/1
650 TABLET ORAL EVERY 6 HOURS PRN
Status: DISCONTINUED | OUTPATIENT
Start: 2024-09-05 | End: 2024-09-09 | Stop reason: HOSPADM

## 2024-09-05 RX ADMIN — CLONAZEPAM 0.5 MG: 0.5 TABLET ORAL at 17:28

## 2024-09-05 RX ADMIN — QUETIAPINE FUMARATE 150 MG: 100 TABLET ORAL at 21:35

## 2024-09-05 RX ADMIN — IBUPROFEN 400 MG: 400 TABLET, FILM COATED ORAL at 09:18

## 2024-09-05 RX ADMIN — CLONAZEPAM 0.5 MG: 0.5 TABLET ORAL at 10:31

## 2024-09-05 RX ADMIN — Medication 3 MG: at 21:35

## 2024-09-05 RX ADMIN — IBUPROFEN 400 MG: 400 TABLET, FILM COATED ORAL at 13:29
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
## 2024-09-05 NOTE — NURSING NOTE
Pt reports mild headache, requested and received PRN Ibuprofen 400mg, 3/10 pain level. On reassessment, pt reports improvement, 0/10 pain score at this time.

## 2024-09-05 NOTE — PLAN OF CARE
Problem: Alteration in Thoughts and Perception  Goal: Treatment Goal: Gain control of psychotic behaviors/thinking, reduce/eliminate presenting symptoms and demonstrate improved reality functioning upon discharge  Outcome: Progressing  Goal: Verbalize thoughts and feelings  Description: Interventions:  - Promote a nonjudgmental and trusting relationship with the patient through active listening and therapeutic communication  - Assess patient's level of functioning, behavior and potential for risk  - Engage patient in 1 on 1 interactions  - Encourage patient to express fears, feelings, frustrations, and discuss symptoms    - [place] patient to reality, help patient recognize reality-based thinking   - Administer medications as ordered and assess for potential side effects  - Provide the patient education related to the signs and symptoms of the illness and desired effects of prescribed medications  Outcome: Progressing  Goal: Refrain from acting on delusional thinking/internal stimuli  Description: Interventions:  - Monitor patient closely, per order   - Utilize least restrictive measures   - Set reasonable limits, give positive feedback for acceptable   - Administer medications as ordered and monitor of potential side effects  Outcome: Progressing  Goal: Agree to be compliant with medication regime, as prescribed and report medication side effects  Description: Interventions:  - Offer appropriate PRN medication and supervise ingestion; conduct AIMS, as needed   Outcome: Progressing  Goal: Attend and participate in unit activities, including therapeutic, recreational, and educational groups  Description: Interventions:  -Encourage Visitation and family involvement in care  Outcome: Progressing  Goal: Recognize dysfunctional thoughts, communicate reality-based thoughts at the time of discharge  Description: Interventions:  - Provide medication and psycho-education to assist patient in compliance and developing  insight into his/her illness   Outcome: Progressing  Goal: Complete daily ADLs, including personal hygiene independently, as able  Description: Interventions:  - Observe, teach, and assist patient with ADLS  - Monitor and promote a balance of rest/activity, with adequate nutrition and elimination   Outcome: Progressing     Problem: Anxiety  Goal: Anxiety is at manageable level  Description: Interventions:  - Assess and monitor patient's anxiety level.   - Monitor for signs and symptoms (heart palpitations, chest pain, shortness of breath, headaches, nausea, feeling jumpy, restlessness, irritable, apprehensive).   - Collaborate with interdisciplinary team and initiate plan and interventions as ordered.  - [place] patient to unit/surroundings  - Explain treatment plan  - Encourage participation in care  - Encourage verbalization of concerns/fears  - Identify coping mechanisms  - Assist in developing anxiety-reducing skills  - Administer/offer alternative therapies  - Limit or eliminate stimulants  Outcome: Progressing     Problem: Alteration in Orientation  Goal: Treatment Goal: Demonstrate a reduction of confusion and improved orientation to person, place, time and/or situation upon discharge, according to optimum baseline/ability  Outcome: Progressing  Goal: Interact with staff daily  Description: Interventions:  - Assess and re-assess patient's level of orientation  - Engage patient in 1 on 1 interactions, daily, for a minimum of 15 minutes   - Establish rapport/trust with patient   Outcome: Progressing  Goal: Express concerns related to confused thinking related to:  Description: Interventions:  - Encourage patient to express feelings, fears, frustrations, hopes  - Assign consistent caregivers   - [place]/re-orient patient as needed  - Allow comfort items, as appropriate  - Provide visual cues, signs, etc.   Outcome: Progressing  Goal: Allow medical examinations, as recommended  Description: Interventions:  -  Provide physical/neurological exams and/or referrals, per provider   Outcome: Progressing  Goal: Cooperate with recommended testing/procedures  Description: Interventions:  - Determine need for ancillary testing  - Observe for mental status changes  - Implement falls/precaution protocol   Outcome: Progressing  Goal: Attend and participate in unit activities, including therapeutic, recreational, and educational groups  Description: Interventions:  - Provide therapeutic and educational activities daily, encourage attendance and participation, and document same in the medical record   - Provide appropriate opportunities for reminiscence   - Provide a consistent daily routine   - Encourage family contact/visitation   Outcome: Progressing  Goal: Complete daily ADLs, including personal hygiene independently, as able  Description: Interventions:  - Observe, teach, and assist patient with ADLS  Outcome: Progressing

## 2024-09-05 NOTE — PROGRESS NOTES
09/05/24 9712   Team Meeting   Meeting Type Daily Rounds   Team Members Present   Team Members Present Physician;Nurse;;Other (Discipline and Name)   Physician Team Member Dr. Amaro / THO Pollock / PA Students / JERONIMO Gage   Nursing Team Member Rogelio / Lew / Nursing Students   Care Management Team Member Mara / Shawanda / Stan   Other (Discipline and Name) Sigmund - Group Facilitator   Patient/Family Present   Patient Present No   Patient's Family Present No     DC: TBD - continuing to monitor pt and medications.

## 2024-09-05 NOTE — DISCHARGE INSTR - OTHER ORDERS
[facility] CRISIS INFORMATION   [facility] is a confidential 7 days/week telephone support service manned by trained mental health consumers.  [facility]armline operates daily but is not able to accept calls between 2AM-6AM.   [facility] provides support, a listening ear and can provide information about available services. [facility] specializes in the concerns of mental health consumers, their families and friends.  However, we are also here for anyone who has a mental health concern, is confused about or just doesn't know anything about mental health or where to get information. To reach [facility], call 24 hours a day [phone]   Text CONNECT to 000459 from anywhere in the [place], anytime, about any type of crisis.  A live, trained Crisis Counselor receives the text and lets you know that they are here to listen.  The volunteer Crisis Counselor will help you move from a hot moment to a cool moment.  [facility] Crisis Intervention - licensed telephone and mobile crisis services that provide mental health assessments to all age groups regardless of income or insurance.  Crisis Intervention operates 24-hour/7 days a week. [facility] Crisis Intervention assists consumer who are experiencing a mental health emergency and lack the resources to assist themselves.  Immediate intervention for suicidal and depressed individuals with home visits/outreach being top priority. Crisis can be contacted at [phone].   The National [employer] Mental Illness (TAMMY) offers various education & support groups for you & your family.  For more information visit their website at   http://www.tammy-lv.org/.  Dial 2-1-1 to get connected/get help.  Free, confidential information & referral available 24/7: Aging Services, Child & Youth Services, Counseling, Education/Training, Food/Shelter/Clothing, Health Services, Parenting, Substance Abuse, Support Groups, Volunteer Opportunities, & much more.  Phone: 2-1-1 or [phone], Web:  www.bt583kdtq.org, Email: [email or web address]
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
## 2024-09-05 NOTE — DISCHARGE INSTR - APPOINTMENTS
Zelda our Behavioral Health Nurse Navigator, will be calling you after your discharge, on the phone number that you provided.  She will be available as an additional support, if needed.   If you wish to speak with Zelda, you may contact her at [phone].
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
## 2024-09-05 NOTE — NURSING NOTE
Pt returned with a 3/10 headache, points to b/l temporal area. Denies taking allergy medicine or migraine medication at home. Reports typically using Tylenol or Motrin at home for pain. Writer spoke with THO to see if Tylenol could also be available to breakthrough pain. Pt made aware. Pt states that they are trying to rest but HA was preventing this. Refill of water provided with Ibuprofen 400mg po at 1329.

## 2024-09-05 NOTE — NURSING NOTE
Pt ambulating unit on approach. Calm, cooperative and very demure during assessment. Pt endorses some mild depression and anxiety r/t recent issues with sleep. Reassured by this writer. Denies SI/HI/AVH. Plan of care ongoing. Pt denies any other unmet needs or concerns at this time.

## 2024-09-06 PROBLEM — Z00.8 MEDICAL CLEARANCE FOR PSYCHIATRIC ADMISSION: Status: RESOLVED | Noted: 2024-09-04 | Resolved: 2024-09-06

## 2024-09-06 PROBLEM — F41.9 ANXIETY: Status: RESOLVED | Noted: 2024-09-04 | Resolved: 2024-09-06

## 2024-09-06 PROBLEM — F32.3 MDD (MAJOR DEPRESSIVE DISORDER), SINGLE EPISODE, SEVERE WITH PSYCHOTIC FEATURES (HCC): Status: ACTIVE | Noted: 2024-09-04

## 2024-09-06 LAB
ATRIAL RATE: 63 BPM
P AXIS: 55 DEGREES
PR INTERVAL: 162 MS
QRS AXIS: 64 DEGREES
QRSD INTERVAL: 80 MS
QT INTERVAL: 406 MS
QTC INTERVAL: 415 MS
T WAVE AXIS: 56 DEGREES
VENTRICULAR RATE: 63 BPM

## 2024-09-06 PROCEDURE — 93010 ELECTROCARDIOGRAM REPORT: CPT | Performed by: INTERNAL MEDICINE

## 2024-09-06 PROCEDURE — 99232 SBSQ HOSP IP/OBS MODERATE 35: CPT | Performed by: STUDENT IN AN ORGANIZED HEALTH CARE EDUCATION/TRAINING PROGRAM

## 2024-09-06 RX ORDER — CLONAZEPAM 0.5 MG/1
0.25 TABLET ORAL 2 TIMES DAILY
Status: DISCONTINUED | OUTPATIENT
Start: 2024-09-08 | End: 2024-09-09 | Stop reason: HOSPADM

## 2024-09-06 RX ORDER — QUETIAPINE FUMARATE 200 MG/1
200 TABLET, FILM COATED ORAL
Status: DISCONTINUED | OUTPATIENT
Start: 2024-09-06 | End: 2024-09-09 | Stop reason: HOSPADM

## 2024-09-06 RX ORDER — CLONAZEPAM 0.5 MG/1
0.5 TABLET ORAL 2 TIMES DAILY
Status: COMPLETED | OUTPATIENT
Start: 2024-09-06 | End: 2024-09-07

## 2024-09-06 RX ADMIN — Medication 3 MG: at 21:13

## 2024-09-06 RX ADMIN — QUETIAPINE 200 MG: 200 TABLET ORAL at 21:13

## 2024-09-06 RX ADMIN — CLONAZEPAM 0.5 MG: 0.5 TABLET ORAL at 17:10

## 2024-09-06 RX ADMIN — IBUPROFEN 400 MG: 400 TABLET, FILM COATED ORAL at 05:43

## 2024-09-06 RX ADMIN — CLONAZEPAM 0.5 MG: 0.5 TABLET ORAL at 08:57
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
## 2024-09-06 NOTE — PROGRESS NOTES
"Progress Note - Behavioral Health     Tiff Hardin 39 y.o. female MRN: [id number]   Unit/Bed#: U 208-02 Encounter: [id number]    Behavior over the last 24 hours: some improvement.     Tiff is a 39-year-old female with a history of mood disorder who presents for psychiatric follow-up.  Staff reports no behavioral issues overnight.  She is notably more pleasant, calm and cooperative upon approach today and visibly less anxious.  She reports improved sleep following her Seroquel increase and addition of Klonopin.  She remains concerned about her housing situation and states that she cannot return home for fear of \"people bothering me.\"  Patient's mental status devolves as the conversation progresses and her paranoid delusions become notably more apparent with further probing.  She describes needing to go to the police officers because she felt like people were trying to break into her car.  She believes these people are her neighbors and that they are tormenting her so that she will sell her house to them.  She believes they are entering her home when she is absent and \"slashing water bottles and moving objects around to try and scare me.\"  Reality testing is poor regarding these issues.  She tells me that she went to police who told her that there is nothing to worry about, however, she states that she does not feel safe at home and is fearful of both her life and her son's life.  She would like to speak with case management about alternative housing options.  She is unable to tell me why she is being targeted in particular, other than her belief that her neighbors wish to purchase her home.  Separate from this paranoid delusion, her mental status is fairly improved today and she denies any SI or HI.  Denies any auditory or visual hallucinations.  Though she is perseverative on her issues with her neighbors, her thought processes appear mostly organized and linear and there is no obvious disorganized " behavior.    Sleep: improved, slept better  Appetite: normal  Medication side effects: No   ROS: all other systems are negative    Mental Status Evaluation:    Appearance:  age appropriate, casually dressed, adequate grooming   Behavior:  More calm and cooperative, no more psychomotor agitation   Speech:  Improved, no longer loud or pressured   Mood:  Anxious, less irritable, less labile   Affect:  constricted, mood-congruent, anxious appearing   Thought Process:  illogical, perseverative regarding paranoid content, otherwise mostly organized and linear   Associations: intact associations   Thought Content:  paranoid delusions   Perceptual Disturbances: no auditory hallucinations, no visual hallucinations, does not appear responding to internal stimuli   Risk Potential: Suicidal ideation - None at present  Homicidal ideation - None at present  Potential for aggression - Not at present   Sensorium:  oriented to person, place, and time/date   Memory:  recent and remote memory grossly intact   Consciousness:  alert and awake   Attention/Concentration: attention span and concentration appear shorter than expected for age   Insight:  limited   Judgment: limited   Gait/Station: normal gait/station   Motor Activity: no abnormal movements     Vital signs in last 24 hours:    Temp:  [97.9 °F (36.6 °C)] 97.9 °F (36.6 °C)  HR:  [62] 62  Resp:  [18] 18  BP: (131)/(101) 131/101    Laboratory results: I have personally reviewed all pertinent laboratory/tests results    Results from the past 24 hours: No results found for this or any previous visit (from the past 24 hour(s)).  Most Recent Labs:   Lab Results   Component Value Date    WBC 4.99 09/04/2024    RBC 4.47 09/04/2024    HGB 13.1 09/04/2024    HCT 40.4 09/04/2024     09/04/2024    RDW 12.9 09/04/2024    NEUTROABS 2.82 09/04/2024    SODIUM 135 09/04/2024    K 4.0 09/04/2024     09/04/2024    CO2 26 09/04/2024    BUN 8 09/04/2024    CREATININE 0.64 09/04/2024     GLUC 73 09/04/2024    CALCIUM 9.3 09/04/2024    AST 34 09/04/2024    ALT 17 09/04/2024    ALKPHOS 58 09/04/2024    TP 7.6 09/04/2024    ALB 4.5 09/04/2024    TBILI 1.33 (H) 09/04/2024    CHOLESTEROL 158 09/04/2024    HDL 56 09/04/2024    TRIG 100 09/04/2024    LDLCALC 82 09/04/2024    NONHDLC 102 09/04/2024    YCW8DFEAGRJG 2.528 09/04/2024    FREET4 0.87 12/28/2023    PREGUR negative 01/23/2022    PREGSERUM Negative 09/04/2024    SYPHILISAB Non-reactive 09/04/2024       Progress Toward Goals: progressing slowly    Assessment & Plan   Assessment & Plan  Generalized anxiety disorder with panic attacks  Started Klonopin 0.5 mg twice a day.  Cannabis use disorder  Provided counseling, patient does not wish to continue smoking.  MDD (major depressive disorder), single episode, severe with psychotic features (HCC)  Seroquel titration ongoing.     Principal Problem:    MDD (major depressive disorder), single episode, severe with psychotic features (HCC)  Active Problems:    Anxiety    Medical clearance for psychiatric admission    Generalized anxiety disorder with panic attacks    Cannabis use disorder      Recommended Treatment:     Planned medication and treatment changes:    All current active medications have been reviewed  Encourage group therapy, milieu therapy and occupational therapy  Behavioral Health checks every 7 minutes    Increase Seroquel to 200 mg at bedtime for paranoid delusions.  Continue Klonopin 0.5 mg twice a day for anxiety with plan to taper off beginning on Sunday.    The patient initially presented primarily with affective symptoms and prominent anxiety, it has become clear that her paranoid delusions are quite prominent and may be underlying her anxiety.  She was not receptive to psychotherapy or reality testing.  Does not imminently present as a risk of harm to self or others but would benefit from further time and patient to further adjust medications and try to gain some insight into her  mental health issues.    Current Facility-Administered Medications   Medication Dose Route Frequency Provider Last Rate    acetaminophen  650 mg Oral Q6H PRN GERMAINE BernardC      aluminum-magnesium hydroxide-simethicone  30 mL Oral Q4H PRN Shira Hess MD      haloperidol lactate  2.5 mg Intramuscular Q4H PRN Max 4/day Shira Hess MD      And    LORazepam  1 mg Intramuscular Q4H PRN Max 4/day Shira Hess MD      And    benztropine  0.5 mg Intramuscular Q4H PRN Max 4/day Shira Hess MD      haloperidol lactate  5 mg Intramuscular Q4H PRN Max 4/day Shira Hess MD      And    LORazepam  2 mg Intramuscular Q4H PRN Max 4/day Shira Hess MD      And    benztropine  1 mg Intramuscular Q4H PRN Max 4/day Shira Hess MD      benztropine  1 mg Intramuscular Q4H PRN Max 6/day Shira Hess MD      benztropine  1 mg Oral Q4H PRN Max 6/day Shira Hess MD      bisacodyl  10 mg Rectal Daily PRN Shira Hess MD      clonazePAM  0.5 mg Oral BID Jose Pollock PA-C      hydrOXYzine HCL  50 mg Oral Q6H PRN Max 4/day Shira Hess MD      Or    diphenhydrAMINE  50 mg Intramuscular Q6H PRN Shira Hess MD      haloperidol  1 mg Oral Q6H PRN Shira Hess MD      haloperidol  2.5 mg Oral Q4H PRN Max 4/day Shira Hess MD      haloperidol  5 mg Oral Q4H PRN Max 4/day Shira Hess MD      hydrOXYzine HCL  100 mg Oral Q6H PRN Max 4/day Shira Hess MD      Or    LORazepam  2 mg Intramuscular Q6H PRN Shira Hess MD      hydrOXYzine HCL  25 mg Oral Q6H PRN Max 4/day Shira Hess MD      ibuprofen  400 mg Oral Q4H PRN Shira Hess MD      ibuprofen  800 mg Oral Q8H PRN Shira Hess MD      melatonin  3 mg Oral HS Shira Hess MD      polyethylene glycol  17 g Oral Daily PRN Shira Hess MD      propranolol  10 mg Oral Q8H PRN Shira Hess MD      QUEtiapine  150 mg Oral HS THO Bernard-docusate  sodium  1 tablet Oral Daily PRN Shira Hess MD      traZODone  50 mg Oral HS PRN Shira Hess MD       Risks / Benefits of Treatment:    Risks, benefits, and possible side effects of medications explained to patient and patient verbalizes understanding and agreement for treatment.    Counseling / Coordination of Care:    Patient's progress discussed with staff in treatment team meeting.  Medications, treatment progress and treatment plan reviewed with patient.    Jose Pollock PA-C 09/06/24

## 2024-09-06 NOTE — BH TRANSITION RECORD
Contact Information: If you have any questions, concerns, pended studies, tests and/or procedures, or emergencies regarding your inpatient behavioral health visit. Please contact [place] behavioral [facility] [phone] and ask to speak to a , nurse or physician. A contact is available 24 hours/ 7 days a week at this number.     Summary of Procedures Performed During your Stay:  Below is a list of major procedures performed during your hospital stay and a summary of results:  - No major procedures performed.    Pending Studies (From admission, onward)      None          Please follow up on the above pending studies with your PCP and/or referring provider.

## 2024-09-06 NOTE — PLAN OF CARE
Problem: Alteration in Thoughts and Perception  Goal: Treatment Goal: Gain control of psychotic behaviors/thinking, reduce/eliminate presenting symptoms and demonstrate improved reality functioning upon discharge  Outcome: Progressing  Goal: Verbalize thoughts and feelings  Description: Interventions:  - Promote a nonjudgmental and trusting relationship with the patient through active listening and therapeutic communication  - Assess patient's level of functioning, behavior and potential for risk  - Engage patient in 1 on 1 interactions  - Encourage patient to express fears, feelings, frustrations, and discuss symptoms    - [place] patient to reality, help patient recognize reality-based thinking   - Administer medications as ordered and assess for potential side effects  - Provide the patient education related to the signs and symptoms of the illness and desired effects of prescribed medications  Outcome: Progressing  Goal: Refrain from acting on delusional thinking/internal stimuli  Description: Interventions:  - Monitor patient closely, per order   - Utilize least restrictive measures   - Set reasonable limits, give positive feedback for acceptable   - Administer medications as ordered and monitor of potential side effects  Outcome: Progressing  Goal: Agree to be compliant with medication regime, as prescribed and report medication side effects  Description: Interventions:  - Offer appropriate PRN medication and supervise ingestion; conduct AIMS, as needed   Outcome: Progressing  Goal: Attend and participate in unit activities, including therapeutic, recreational, and educational groups  Description: Interventions:  -Encourage Visitation and family involvement in care  Outcome: Progressing  Goal: Recognize dysfunctional thoughts, communicate reality-based thoughts at the time of discharge  Description: Interventions:  - Provide medication and psycho-education to assist patient in compliance and developing  insight into his/her illness   Outcome: Progressing  Goal: Complete daily ADLs, including personal hygiene independently, as able  Description: Interventions:  - Observe, teach, and assist patient with ADLS  - Monitor and promote a balance of rest/activity, with adequate nutrition and elimination   Outcome: Progressing     Problem: Anxiety  Goal: Anxiety is at manageable level  Description: Interventions:  - Assess and monitor patient's anxiety level.   - Monitor for signs and symptoms (heart palpitations, chest pain, shortness of breath, headaches, nausea, feeling jumpy, restlessness, irritable, apprehensive).   - Collaborate with interdisciplinary team and initiate plan and interventions as ordered.  - [place] patient to unit/surroundings  - Explain treatment plan  - Encourage participation in care  - Encourage verbalization of concerns/fears  - Identify coping mechanisms  - Assist in developing anxiety-reducing skills  - Administer/offer alternative therapies  - Limit or eliminate stimulants  Outcome: Progressing     Problem: Alteration in Orientation  Goal: Treatment Goal: Demonstrate a reduction of confusion and improved orientation to person, place, time and/or situation upon discharge, according to optimum baseline/ability  Outcome: Progressing  Goal: Interact with staff daily  Description: Interventions:  - Assess and re-assess patient's level of orientation  - Engage patient in 1 on 1 interactions, daily, for a minimum of 15 minutes   - Establish rapport/trust with patient   Outcome: Progressing  Goal: Express concerns related to confused thinking related to:  Description: Interventions:  - Encourage patient to express feelings, fears, frustrations, hopes  - Assign consistent caregivers   - [place]/re-orient patient as needed  - Allow comfort items, as appropriate  - Provide visual cues, signs, etc.   Outcome: Progressing  Goal: Allow medical examinations, as recommended  Description: Interventions:  -  Provide physical/neurological exams and/or referrals, per provider   Outcome: Progressing  Goal: Cooperate with recommended testing/procedures  Description: Interventions:  - Determine need for ancillary testing  - Observe for mental status changes  - Implement falls/precaution protocol   Outcome: Progressing  Goal: Attend and participate in unit activities, including therapeutic, recreational, and educational groups  Description: Interventions:  - Provide therapeutic and educational activities daily, encourage attendance and participation, and document same in the medical record   - Provide appropriate opportunities for reminiscence   - Provide a consistent daily routine   - Encourage family contact/visitation   Outcome: Progressing  Goal: Complete daily ADLs, including personal hygiene independently, as able  Description: Interventions:  - Observe, teach, and assist patient with ADLS  Outcome: Progressing

## 2024-09-06 NOTE — CASE MANAGEMENT
"12:50pm  CM called [facility] ([phone]) to see if they have any openings. They reported they do not at this time, to call back next week.     [facility] to refer pt for their CCM services and see if they can meet with pt on Monday to complete intake.     CM sent email to Luisa at [facility] ([email or web address]) to ask if they have any available beds coming up in the next few days. CM requested response.     2:15pm  Pt informed CM that her son is safe with his father in [place]. Pt reported \"he was never in his life but he is helping me out while I am in here.\" Pt reported she felt better about that than pt being in a shelter.     CM met with pt to check in and provide her with update that [facility] does not have a bed at this time and probably won't by Monday. CM let her know that the  from [facility] plans to come see her on Monday.     CM reviewed 2-1-1 information with pt and let her know that any shelter pt would look into, she needs to call 2-1-1 to register for the shelters. JOSH spoke to pt about [address] [place] [address] ([facility] [place]).     CM spoke to pt about walk-in shelters in [place] and [place] but pt not interested.     JOSH explained that in order to be considered for any other shelters, pt would need to register and CM encouraged pt to call. Pt spoke about the phones not always being available and asked if CM can call for her. CM informed her that they need to speak to her to gather information on how to help her. CM let her know CM can attempt to call and see if a representative answers, and then if they don't CM can leave [facility] number to call back and if they call back pt can get on the phone. Pt verbalized understanding.     Pt became frustrated and tearful due to the noise on the unit and feeling like no one is helping her with housing, pt reported \"So this won't get " "worked out by Monday? I need to get out of here, I don't belong here.\" Pt reported frustrations of unit noise and talking about signing herself out. Pt reported \"I came here to get help, but no one is helping me.\" \"I will go out there and be on the streets before I stay here any longer.\" CM explained that any housing or shelter referrals is a process, and CM does not have the ability to find something right away and that everything takes time to work out. Pt verbalized understanding in the moment but appears to be frustrated with her situation.     JOSH called 2-1-1 and was connected with Kaleigh to assist pt with gathering her information for them to provide her with resources. JOSH encouraged pt to ask them about [facility] to see if she can be placed on their list. Kaleigh put pt on hold and transferred her to a specialist who would be able to help her more. Kaleigh informed her that \"the wait time can be pretty long\" Pt transferred to specialist and put on hold. JOSH informed pt that CM will leave her alone with the phone to be on hold. Pt verbalized understanding.       3:15pm   CM was notified by [facility] that pt was still on hold with 2-1-1 and requested to hang up and requested to sign a 72 hour notice to dc.     "

## 2024-09-06 NOTE — NURSING NOTE
Writer met with pt just after lunch. Pt stated feeling much better than this morning due to pt meeting with CM. Pt reports some anxiety, tearful during interaction when talking about missing 13yo son. States that pt's son is no longer in the housing facility, but rather in [place] at father's house. Pt is hopeful to discharge soon and have son return home with her. Pt denies SI, HI, AVH. Discussed Seroquel and Klonopin, potential side effects, therapeutic uses and timing for effectiveness. Pt acknowledged this information. Pt states that CM is going to make referrals and try to help pt find OP follow up care. Pt attends groups and meals, compliant with medication regimen.
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
## 2024-09-07 PROCEDURE — 99232 SBSQ HOSP IP/OBS MODERATE 35: CPT | Performed by: NURSE PRACTITIONER

## 2024-09-07 RX ADMIN — CLONAZEPAM 0.5 MG: 0.5 TABLET ORAL at 08:21

## 2024-09-07 RX ADMIN — Medication 3 MG: at 21:27

## 2024-09-07 RX ADMIN — QUETIAPINE 200 MG: 200 TABLET ORAL at 21:27

## 2024-09-07 RX ADMIN — CLONAZEPAM 0.5 MG: 0.5 TABLET ORAL at 17:01

## 2024-09-07 RX ADMIN — SENNOSIDES AND DOCUSATE SODIUM 1 TABLET: 50; 8.6 TABLET ORAL at 16:54

## 2024-09-07 NOTE — PLAN OF CARE
Problem: Alteration in Thoughts and Perception  Goal: Treatment Goal: Gain control of psychotic behaviors/thinking, reduce/eliminate presenting symptoms and demonstrate improved reality functioning upon discharge  Outcome: Progressing  Goal: Verbalize thoughts and feelings  Description: Interventions:  - Promote a nonjudgmental and trusting relationship with the patient through active listening and therapeutic communication  - Assess patient's level of functioning, behavior and potential for risk  - Engage patient in 1 on 1 interactions  - Encourage patient to express fears, feelings, frustrations, and discuss symptoms    - [place] patient to reality, help patient recognize reality-based thinking   - Administer medications as ordered and assess for potential side effects  - Provide the patient education related to the signs and symptoms of the illness and desired effects of prescribed medications  Outcome: Progressing  Goal: Refrain from acting on delusional thinking/internal stimuli  Description: Interventions:  - Monitor patient closely, per order   - Utilize least restrictive measures   - Set reasonable limits, give positive feedback for acceptable   - Administer medications as ordered and monitor of potential side effects  Outcome: Progressing  Goal: Agree to be compliant with medication regime, as prescribed and report medication side effects  Description: Interventions:  - Offer appropriate PRN medication and supervise ingestion; conduct AIMS, as needed   Outcome: Progressing  Goal: Attend and participate in unit activities, including therapeutic, recreational, and educational groups  Description: Interventions:  -Encourage Visitation and family involvement in care  Outcome: Progressing  Goal: Recognize dysfunctional thoughts, communicate reality-based thoughts at the time of discharge  Description: Interventions:  - Provide medication and psycho-education to assist patient in compliance and developing  insight into his/her illness   Outcome: Progressing  Goal: Complete daily ADLs, including personal hygiene independently, as able  Description: Interventions:  - Observe, teach, and assist patient with ADLS  - Monitor and promote a balance of rest/activity, with adequate nutrition and elimination   Outcome: Progressing     Problem: Anxiety  Goal: Anxiety is at manageable level  Description: Interventions:  - Assess and monitor patient's anxiety level.   - Monitor for signs and symptoms (heart palpitations, chest pain, shortness of breath, headaches, nausea, feeling jumpy, restlessness, irritable, apprehensive).   - Collaborate with interdisciplinary team and initiate plan and interventions as ordered.  - [place] patient to unit/surroundings  - Explain treatment plan  - Encourage participation in care  - Encourage verbalization of concerns/fears  - Identify coping mechanisms  - Assist in developing anxiety-reducing skills  - Administer/offer alternative therapies  - Limit or eliminate stimulants  Outcome: Progressing     Problem: Alteration in Orientation  Goal: Treatment Goal: Demonstrate a reduction of confusion and improved orientation to person, place, time and/or situation upon discharge, according to optimum baseline/ability  Outcome: Progressing  Goal: Interact with staff daily  Description: Interventions:  - Assess and re-assess patient's level of orientation  - Engage patient in 1 on 1 interactions, daily, for a minimum of 15 minutes   - Establish rapport/trust with patient   Outcome: Progressing  Goal: Express concerns related to confused thinking related to:  Description: Interventions:  - Encourage patient to express feelings, fears, frustrations, hopes  - Assign consistent caregivers   - [place]/re-orient patient as needed  - Allow comfort items, as appropriate  - Provide visual cues, signs, etc.   Outcome: Progressing  Goal: Allow medical examinations, as recommended  Description: Interventions:  -  Provide physical/neurological exams and/or referrals, per provider   Outcome: Progressing  Goal: Cooperate with recommended testing/procedures  Description: Interventions:  - Determine need for ancillary testing  - Observe for mental status changes  - Implement falls/precaution protocol   Outcome: Progressing  Goal: Attend and participate in unit activities, including therapeutic, recreational, and educational groups  Description: Interventions:  - Provide therapeutic and educational activities daily, encourage attendance and participation, and document same in the medical record   - Provide appropriate opportunities for reminiscence   - Provide a consistent daily routine   - Encourage family contact/visitation   Outcome: Progressing  Goal: Complete daily ADLs, including personal hygiene independently, as able  Description: Interventions:  - Observe, teach, and assist patient with ADLS  Outcome: Progressing

## 2024-09-07 NOTE — PROGRESS NOTES
"Progress Note - Behavioral Health   Tiff Hardin 39 y.o. female MRN: [id number]  Unit/Bed#: [facility] 208-02 Encounter: [id number]      Subjective:     Documentation, nursing notes, medication reconciliation, labs, and vitals reviewed. Patient was seen for continuing care and reviewed with treatment team.  No acute events over the past 24 hours. Per nursing report, the patient continued to display ongoing paranoia overnight. No medication changes over the past 24 hours.     Continues to tolerate current medications with no adverse effects.     On evaluation today the patient reports their mood as \" shaky \". The patient was in her room on assessment and asked for our conversation to be had in a private room. The patient continues to display ongoing paranoia that\"a woman was sent her to hurt me.\" Presently, she rates bother her depression and anxiety 7/10, 10 being the worst.   No thoughts to harm others.  Offers no further complaints.       Psychiatric ROS:  Behavior over the last 24 hours: unchanged  Sleep: normal  Appetite: normal  Medication side effects: No   ROS: no complaints, all other systems are negative      Mental Status Evaluation:    Appearance:  casually dressed   Behavior:  guarded   Speech:  normal rate and volume   Mood:  irritable   Affect:  mood-congruent   Thought Process:  illogical, perseverative   Associations: intact associations   Thought Content:  paranoid ideation   Perceptual Disturbances: no auditory hallucinations, no visual hallucinations   Risk Potential: Suicidal ideation - None at present  Homicidal ideation - None at present  Potential for aggression - No   Sensorium:  oriented to person, place, and time/date   Memory:  recent and remote memory grossly intact   Consciousness:  alert and awake   Attention/Concentration: attention span and concentration appear shorter than expected for age   Insight:  limited   Judgment: limited   Gait/Station: normal gait/station   Motor Activity: no " abnormal movements       Vital signs in last 24 hours:    Temp:  [98.1 °F (36.7 °C)-98.2 °F (36.8 °C)] 98.2 °F (36.8 °C)  HR:  [57-84] 84  Resp:  [16-18] 18  BP: (114-115)/(73-86) 115/86    Laboratory results: I have personally reviewed all pertinent laboratory/tests results    Results from the past 24 hours: No results found for this or any previous visit (from the past 24 hour(s)).      Progress Toward Goals: insight remains poor    Suicide/Homicide Risk Assessment:    Risk of Harm to Self:   Nursing Suicide Risk Assessment Last 24 hours: C-SSRS Risk (Since Last Contact)  Calculated C-SSRS Risk Score (Since Last Contact): No Risk Indicated  Current Specific Risk Factors include: mental illness diagnosis  Protective Factors: no current suicidal ideation  Based on today's assessment, Tiff presents the following risk of harm to self: none    Risk of Harm to Others:  Nursing Homicide Risk Assessment: Violence Risk to Others: Denies within past 6 months  Current Specific Risk Factors include: none  Protective Factors: no current homicidal ideation  Based on today's assessment, Tiff presents the following risk of harm to others: none    Assessment & Plan   Principal Problem:    MDD (major depressive disorder), single episode, severe with psychotic features (HCC)  Active Problems:    Generalized anxiety disorder with panic attacks    Cannabis use disorder      Recommended Treatment:     Planned medication and treatment changes:    All current active medications have been reviewed  Encourage group therapy, milieu therapy and occupational therapy  Behavioral Health checks every 7 minutes  Continue with SLIM medical management as indicated  Disposition planning ongoing    Continue current medications:    Current Facility-Administered Medications   Medication Dose Route Frequency Provider Last Rate    acetaminophen  650 mg Oral Q6H PRN Jose Pollock PA-C      aluminum-magnesium hydroxide-simethicone  30 mL Oral Q4H PRN  Shira Hess MD      haloperidol lactate  2.5 mg Intramuscular Q4H PRN Max 4/day Shira Hess MD      And    LORazepam  1 mg Intramuscular Q4H PRN Max 4/day Shira Hess MD      And    benztropine  0.5 mg Intramuscular Q4H PRN Max 4/day Shira Hess MD      haloperidol lactate  5 mg Intramuscular Q4H PRN Max 4/day Shira Hess MD      And    LORazepam  2 mg Intramuscular Q4H PRN Max 4/day Shira Hess MD      And    benztropine  1 mg Intramuscular Q4H PRN Max 4/day Shira Hess MD      benztropine  1 mg Intramuscular Q4H PRN Max 6/day Shira Hess MD      benztropine  1 mg Oral Q4H PRN Max 6/day Shira Hess MD      bisacodyl  10 mg Rectal Daily PRN Shira Hess MD      clonazePAM  0.5 mg Oral BID Jose Pollock PA-C      Followed by    [START ON 9/8/2024] clonazePAM  0.25 mg Oral BID Jose Pollock PA-C      hydrOXYzine HCL  50 mg Oral Q6H PRN Max 4/day Shira Hess MD      Or    diphenhydrAMINE  50 mg Intramuscular Q6H PRN Shira Hess MD      haloperidol  1 mg Oral Q6H PRN Shira Hess MD      haloperidol  2.5 mg Oral Q4H PRN Max 4/day Shira Hess MD      haloperidol  5 mg Oral Q4H PRN Max 4/day Shira Hess MD      hydrOXYzine HCL  100 mg Oral Q6H PRN Max 4/day Shira Hess MD      Or    LORazepam  2 mg Intramuscular Q6H PRN Shira Hess MD      hydrOXYzine HCL  25 mg Oral Q6H PRN Max 4/day Shira Hess MD      ibuprofen  400 mg Oral Q4H PRN Shira Hess MD      ibuprofen  800 mg Oral Q8H PRN Shira Hess MD      melatonin  3 mg Oral HS Shira Hess MD      polyethylene glycol  17 g Oral Daily PRN Shira Hess MD      propranolol  10 mg Oral Q8H PRN Shira Hess MD      QUEtiapine  200 mg Oral HS Jose Pollock PA-C      senna-docusate sodium  1 tablet Oral Daily PRN Shira Hess MD      traZODone  50 mg Oral HS PRN Shira Hess MD           Risks / Benefits of Treatment:    Risks,  benefits, and possible side effects of medications explained to patient and patient verbalizes understanding and agreement for treatment.    Counseling / Coordination of Care:    Patient's progress discussed with staff in treatment team meeting.  Medications, treatment progress and treatment plan reviewed with patient.    Note Share    This note was not shared with the patient due to this is a psychotherapy note    JERONIMO Berry 09/07/24

## 2024-09-07 NOTE — TREATMENT TEAM
09/07/24 0900   Team Meeting   Meeting Type Daily Rounds   Team Members Present   Team Members Present Physician;Nurse   Physician Team Member Alex/Donny   Nursing Team Member Conchis   Patient/Family Present   Patient Present No   Patient's Family Present No       AM rounds- denies SI/HI, hopeful to discharge soon. Reports some anxiety and paranoia. Reports disruptive peer on the unit upsetting her. Signed 72 hour notice on 9.6 1508. Slept well.

## 2024-09-07 NOTE — NURSING NOTE
"This writer speaking with pt at bedside. This writer informed pt of room change would be happening today. Pt previously complaining of poor sleep d/t female peer yelling. Pt states \"Oh, so you are putting me next to the person that is trying to kill me\". This writer asking pt who pt is referring to. States \"the lady with the eyes that came yesterday\". Pt paranoid and preoccupied. Pt believes female peer on unit is \"working\" with people pt knows outside of here. Pt eating breakfast in room. Pt tearful, asking about discharge. States \"I need to go home, I can't be here\". Pt denies SI/HI. Pt denies hallucination and paranoia.   "

## 2024-09-07 NOTE — NURSING NOTE
@16:54 RN administered 1 tablet of Senokot for constipation. Self-reports small BM this morning, but experienced difficulty passing stool.

## 2024-09-08 PROCEDURE — 99232 SBSQ HOSP IP/OBS MODERATE 35: CPT | Performed by: NURSE PRACTITIONER

## 2024-09-08 RX ADMIN — CLONAZEPAM 0.25 MG: 0.5 TABLET ORAL at 09:18

## 2024-09-08 RX ADMIN — QUETIAPINE 200 MG: 200 TABLET ORAL at 21:26

## 2024-09-08 RX ADMIN — ACETAMINOPHEN 650 MG: 325 TABLET, FILM COATED ORAL at 11:02

## 2024-09-08 RX ADMIN — Medication 3 MG: at 21:26

## 2024-09-08 RX ADMIN — CLONAZEPAM 0.25 MG: 0.5 TABLET ORAL at 17:09

## 2024-09-08 NOTE — NURSING NOTE
"After nursing education group, pt requesting to speak writer privately. Nursing education group was about discharge planning. Pt states \"I just want to make sure I am not leaving with anyone else tomorrow. You know that lady is here watching me\". Pt actively paranoid about female peer on the unit \"following\" pt. Provided with reassurance and encouraged to speak with primary treatment tomorrow. Pt not interested in PRNs at this time.   "

## 2024-09-08 NOTE — NURSING NOTE
C/O of constipation, prune juice given, tearful worried that mother is coming from [place], concerned about son, reassurance given, denies SI AVH at this time

## 2024-09-08 NOTE — NURSING NOTE
Pt pleasant during interaction. Attending groups and social with peers. Remains having intermittent paranoia. Feels safe on the unit. Walking the halls and talking with roommate. Denies SI/HI or hallucination. Compliant with medication. Able to express needs. Denies any question or concern at this time.

## 2024-09-08 NOTE — PROGRESS NOTES
"Progress Note - Behavioral Health   Tfif Hardin 39 y.o. female MRN: [id number]  Unit[facility]Bed#: [facility] 202-02 Encounter: [id number]      Subjective:     Documentation, nursing notes, medication reconciliation, labs, and vitals reviewed. Patient was seen for continuing care and reviewed with treatment team.  No acute events over the past 24 hours. Per nursing report, Tiff continued to make paranoid statement about peers overnight. No medication changes over the past 24 hours.     Continues to tolerate current medications with no adverse effects.     On evaluation today the patient reports their mood as \" okay \". On assessment, the patient was social with peers in the TV room. Presently, she rates her depression 8/10, 10 being the worst and her anxiety 8/10, 10 being the worst. Tiff continues to endorse \"there is someone here watching me.\" She expressed ongoing concerns \"about the situation with my son\" but declined to elaborate further.     No self-harming/suicidal ideation, plan, or intent upon direct inquiry. No thoughts to harm others.  No agitation or aggression noted. Does not appear overtly manic. Offers no further complaints.       Psychiatric ROS:  Behavior over the last 24 hours: unchanged  Sleep: normal  Appetite: normal  Medication side effects: No   ROS: no complaints, all other systems are negative      Mental Status Evaluation:    Appearance:  casually dressed   Behavior:  guarded   Speech:  normal rate and volume   Mood:  \"Okay\"   Affect:  mood-congruent   Thought Process:  illogical   Associations: intact associations   Thought Content:  paranoid ideation   Perceptual Disturbances: no auditory hallucinations, no visual hallucinations   Risk Potential: Suicidal ideation - None at present  Homicidal ideation - None at present  Potential for aggression - No   Sensorium:  oriented to person, place, and time/date   Memory:  recent and remote memory grossly intact   Consciousness:  alert and awake "   Attention/Concentration: attention span and concentration appear shorter than expected for age   Insight:  limited   Judgment: limited   Gait/Station: normal gait/station   Motor Activity: no abnormal movements       Vital signs in last 24 hours:    Temp:  [97.7 °F (36.5 °C)-98.9 °F (37.2 °C)] 98.9 °F (37.2 °C)  HR:  [66-69] 66  Resp:  [17-18] 18  BP: (111-123)/(62-76) 123/62    Laboratory results: I have personally reviewed all pertinent laboratory/tests results    Results from the past 24 hours: No results found for this or any previous visit (from the past 24 hour(s)).      Progress Toward Goals: progressing    Suicide/Homicide Risk Assessment:    Risk of Harm to Self:   Nursing Suicide Risk Assessment Last 24 hours: C-SSRS Risk (Since Last Contact)  Calculated C-SSRS Risk Score (Since Last Contact): No Risk Indicated  Current Specific Risk Factors include: mental illness diagnosis  Protective Factors: Protective Factors: The patient does not want to die, The patient does not want to hurt family/friends  Based on today's assessment, Tiff presents the following risk of harm to self: minimal    Risk of Harm to Others:  Nursing Homicide Risk Assessment: Violence Risk to Others: Denies within past 6 months  Current Specific Risk Factors include: none  Protective Factors: no current homicidal ideation  Based on today's assessment, Tiff presents the following risk of harm to others: minimal    Assessment & Plan   Principal Problem:    MDD (major depressive disorder), single episode, severe with psychotic features (HCC)  Active Problems:    Generalized anxiety disorder with panic attacks    Cannabis use disorder      Recommended Treatment:     Planned medication and treatment changes:    All current active medications have been reviewed  Encourage group therapy, milieu therapy and occupational therapy  Behavioral Health checks every 7 minutes  Continue with SLIM medical management as indicated  Disposition planning  ongoing  Continue scheduled klonopin taper     Continue current medications:    Current Facility-Administered Medications   Medication Dose Route Frequency Provider Last Rate    acetaminophen  650 mg Oral Q6H PRN Jose Pollock PA-C      aluminum-magnesium hydroxide-simethicone  30 mL Oral Q4H PRN Shira Hess MD      haloperidol lactate  2.5 mg Intramuscular Q4H PRN Max 4/day Shira Hess MD      And    LORazepam  1 mg Intramuscular Q4H PRN Max 4/day Shira Hess MD      And    benztropine  0.5 mg Intramuscular Q4H PRN Max 4/day Shira Hess MD      haloperidol lactate  5 mg Intramuscular Q4H PRN Max 4/day Shira Hess MD      And    LORazepam  2 mg Intramuscular Q4H PRN Max 4/day Shira Hess MD      And    benztropine  1 mg Intramuscular Q4H PRN Max 4/day Shira Hess MD      benztropine  1 mg Intramuscular Q4H PRN Max 6/day Shira Hess MD      benztropine  1 mg Oral Q4H PRN Max 6/day Shira Hess MD      bisacodyl  10 mg Rectal Daily PRN Shira Hess MD      clonazePAM  0.25 mg Oral BID Jose Pollock PA-C      hydrOXYzine HCL  50 mg Oral Q6H PRN Max 4/day Shira Hess MD      Or    diphenhydrAMINE  50 mg Intramuscular Q6H PRN Shira Hess MD      haloperidol  1 mg Oral Q6H PRN Shira Hess MD      haloperidol  2.5 mg Oral Q4H PRN Max 4/day Shira Hess MD      haloperidol  5 mg Oral Q4H PRN Max 4/day Shira Hess MD      hydrOXYzine HCL  100 mg Oral Q6H PRN Max 4/day Shira Hess MD      Or    LORazepam  2 mg Intramuscular Q6H PRN Shira Hess MD      hydrOXYzine HCL  25 mg Oral Q6H PRN Max 4/day Shira Hess MD      ibuprofen  400 mg Oral Q4H PRN Shira Hess MD      ibuprofen  800 mg Oral Q8H PRN Shira Hess MD      melatonin  3 mg Oral HS Shira Hess MD      polyethylene glycol  17 g Oral Daily PRN Shira Hess MD      propranolol  10 mg Oral Q8H PRN Shira Hess MD      QUEtiapine  200  mg Oral HS Jose Pollock PA-C      senna-docusate sodium  1 tablet Oral Daily PRN Shira Hess MD      traZODone  50 mg Oral HS PRN Shira Hess MD           Risks / Benefits of Treatment:    Risks, benefits, and possible side effects of medications explained to patient and patient verbalizes understanding and agreement for treatment.    Counseling / Coordination of Care:    Patient's progress discussed with staff in treatment team meeting.  Medications, treatment progress and treatment plan reviewed with patient.    Note Share    This note was not shared with the patient due to this is a psychotherapy note    JERONIMO Berry 09/08/24

## 2024-09-08 NOTE — PROGRESS NOTES
09/08/24 1100   Activity/Group Checklist   Group Impromptu group (Comment)  (Discussion with AMADOR/L and peer about frustration caused by peers on the unit and respecting personal boundaries. Also discussed coping skills utilized on the unit as well as outside of the hospital.)   Attendance Attended   Attendance Duration (min) 0-15   Interactions Interacted appropriately   Affect/Mood Appropriate;Bright   Goals Achieved Identified feelings;Identified triggers;Discussed coping strategies;Able to listen to others;Able to engage in interactions;Able to reflect/comment on own behavior;Able to self-disclose;Able to recieve feedback;Able to give feedback to another

## 2024-09-08 NOTE — PLAN OF CARE
Problem: Alteration in Thoughts and Perception  Goal: Treatment Goal: Gain control of psychotic behaviors/thinking, reduce/eliminate presenting symptoms and demonstrate improved reality functioning upon discharge  Outcome: Progressing  Goal: Verbalize thoughts and feelings  Description: Interventions:  - Promote a nonjudgmental and trusting relationship with the patient through active listening and therapeutic communication  - Assess patient's level of functioning, behavior and potential for risk  - Engage patient in 1 on 1 interactions  - Encourage patient to express fears, feelings, frustrations, and discuss symptoms    - [place] patient to reality, help patient recognize reality-based thinking   - Administer medications as ordered and assess for potential side effects  - Provide the patient education related to the signs and symptoms of the illness and desired effects of prescribed medications  Outcome: Progressing  Goal: Refrain from acting on delusional thinking/internal stimuli  Description: Interventions:  - Monitor patient closely, per order   - Utilize least restrictive measures   - Set reasonable limits, give positive feedback for acceptable   - Administer medications as ordered and monitor of potential side effects  Outcome: Progressing  Goal: Agree to be compliant with medication regime, as prescribed and report medication side effects  Description: Interventions:  - Offer appropriate PRN medication and supervise ingestion; conduct AIMS, as needed   Outcome: Progressing  Goal: Attend and participate in unit activities, including therapeutic, recreational, and educational groups  Description: Interventions:  -Encourage Visitation and family involvement in care  Outcome: Progressing  Goal: Recognize dysfunctional thoughts, communicate reality-based thoughts at the time of discharge  Description: Interventions:  - Provide medication and psycho-education to assist patient in compliance and developing  insight into his/her illness   Outcome: Progressing  Goal: Complete daily ADLs, including personal hygiene independently, as able  Description: Interventions:  - Observe, teach, and assist patient with ADLS  - Monitor and promote a balance of rest/activity, with adequate nutrition and elimination   Outcome: Progressing     Problem: Anxiety  Goal: Anxiety is at manageable level  Description: Interventions:  - Assess and monitor patient's anxiety level.   - Monitor for signs and symptoms (heart palpitations, chest pain, shortness of breath, headaches, nausea, feeling jumpy, restlessness, irritable, apprehensive).   - Collaborate with interdisciplinary team and initiate plan and interventions as ordered.  - [place] patient to unit/surroundings  - Explain treatment plan  - Encourage participation in care  - Encourage verbalization of concerns/fears  - Identify coping mechanisms  - Assist in developing anxiety-reducing skills  - Administer/offer alternative therapies  - Limit or eliminate stimulants  Outcome: Progressing     Problem: Alteration in Orientation  Goal: Treatment Goal: Demonstrate a reduction of confusion and improved orientation to person, place, time and/or situation upon discharge, according to optimum baseline/ability  Outcome: Progressing  Goal: Interact with staff daily  Description: Interventions:  - Assess and re-assess patient's level of orientation  - Engage patient in 1 on 1 interactions, daily, for a minimum of 15 minutes   - Establish rapport/trust with patient   Outcome: Progressing  Goal: Express concerns related to confused thinking related to:  Description: Interventions:  - Encourage patient to express feelings, fears, frustrations, hopes  - Assign consistent caregivers   - [place]/re-orient patient as needed  - Allow comfort items, as appropriate  - Provide visual cues, signs, etc.   Outcome: Progressing  Goal: Allow medical examinations, as recommended  Description: Interventions:  -  Provide physical/neurological exams and/or referrals, per provider   Outcome: Progressing  Goal: Cooperate with recommended testing/procedures  Description: Interventions:  - Determine need for ancillary testing  - Observe for mental status changes  - Implement falls/precaution protocol   Outcome: Progressing  Goal: Attend and participate in unit activities, including therapeutic, recreational, and educational groups  Description: Interventions:  - Provide therapeutic and educational activities daily, encourage attendance and participation, and document same in the medical record   - Provide appropriate opportunities for reminiscence   - Provide a consistent daily routine   - Encourage family contact/visitation   Outcome: Progressing  Goal: Complete daily ADLs, including personal hygiene independently, as able  Description: Interventions:  - Observe, teach, and assist patient with ADLS  Outcome: Progressing     Problem: Ineffective Coping  Goal: Participates in unit activities  Description: Interventions:  - Provide therapeutic environment   - Provide required programming   - Redirect inappropriate behaviors   Outcome: Progressing     Problem: DISCHARGE PLANNING  Goal: Discharge to home or other facility with appropriate resources  Description: INTERVENTIONS:  - Identify barriers to discharge w/patient and caregiver  - Arrange for needed discharge resources and transportation as appropriate  - Identify discharge learning needs (meds, wound care, etc.)  - Arrange for interpretive services to assist at discharge as needed  - Refer to Case Management Department for coordinating discharge planning if the patient needs post-hospital services based on physician/advanced practitioner order or complex needs related to functional status, cognitive ability, or social support system  Outcome: Progressing

## 2024-09-08 NOTE — TREATMENT TEAM
09/08/24 0800   Team Meeting   Meeting Type Daily Rounds   Team Members Present   Team Members Present Physician;Nurse   Physician Team Member Alex/Donny   Nursing Team Member Conchis   Patient/Family Present   Patient Present No   Patient's Family Present No       AM rounds- denies SI/HI, paranoid statements about peers. Thought another peer wants to harm them. Wants discharge. Slept well.

## 2024-09-09 VITALS
OXYGEN SATURATION: 100 % | HEART RATE: 63 BPM | BODY MASS INDEX: 25.71 KG/M2 | WEIGHT: 163.8 LBS | HEIGHT: 67 IN | DIASTOLIC BLOOD PRESSURE: 69 MMHG | SYSTOLIC BLOOD PRESSURE: 110 MMHG | RESPIRATION RATE: 16 BRPM | TEMPERATURE: 97.6 F

## 2024-09-09 PROCEDURE — 99239 HOSP IP/OBS DSCHRG MGMT >30: CPT | Performed by: PHYSICIAN ASSISTANT

## 2024-09-09 RX ORDER — QUETIAPINE FUMARATE 200 MG/1
200 TABLET, FILM COATED ORAL
Qty: 30 TABLET | Refills: 0 | Status: SHIPPED | OUTPATIENT
Start: 2024-09-09 | End: 2024-10-09

## 2024-09-09 RX ADMIN — CLONAZEPAM 0.25 MG: 0.5 TABLET ORAL at 09:08

## 2024-09-09 NOTE — PLAN OF CARE
Problem: Alteration in Thoughts and Perception  Goal: Treatment Goal: Gain control of psychotic behaviors/thinking, reduce/eliminate presenting symptoms and demonstrate improved reality functioning upon discharge  Outcome: Adequate for Discharge  Goal: Verbalize thoughts and feelings  Description: Interventions:  - Promote a nonjudgmental and trusting relationship with the patient through active listening and therapeutic communication  - Assess patient's level of functioning, behavior and potential for risk  - Engage patient in 1 on 1 interactions  - Encourage patient to express fears, feelings, frustrations, and discuss symptoms    - [place] patient to reality, help patient recognize reality-based thinking   - Administer medications as ordered and assess for potential side effects  - Provide the patient education related to the signs and symptoms of the illness and desired effects of prescribed medications  Outcome: Adequate for Discharge  Goal: Refrain from acting on delusional thinking/internal stimuli  Description: Interventions:  - Monitor patient closely, per order   - Utilize least restrictive measures   - Set reasonable limits, give positive feedback for acceptable   - Administer medications as ordered and monitor of potential side effects  Outcome: Adequate for Discharge  Goal: Agree to be compliant with medication regime, as prescribed and report medication side effects  Description: Interventions:  - Offer appropriate PRN medication and supervise ingestion; conduct AIMS, as needed   Outcome: Adequate for Discharge  Goal: Attend and participate in unit activities, including therapeutic, recreational, and educational groups  Description: Interventions:  -Encourage Visitation and family involvement in care  Outcome: Adequate for Discharge  Goal: Recognize dysfunctional thoughts, communicate reality-based thoughts at the time of discharge  Description: Interventions:  - Provide medication and  psycho-education to assist patient in compliance and developing insight into his/her illness   Outcome: Adequate for Discharge  Goal: Complete daily ADLs, including personal hygiene independently, as able  Description: Interventions:  - Observe, teach, and assist patient with ADLS  - Monitor and promote a balance of rest/activity, with adequate nutrition and elimination   Outcome: Adequate for Discharge     Problem: Anxiety  Goal: Anxiety is at manageable level  Description: Interventions:  - Assess and monitor patient's anxiety level.   - Monitor for signs and symptoms (heart palpitations, chest pain, shortness of breath, headaches, nausea, feeling jumpy, restlessness, irritable, apprehensive).   - Collaborate with interdisciplinary team and initiate plan and interventions as ordered.  - [place] patient to unit/surroundings  - Explain treatment plan  - Encourage participation in care  - Encourage verbalization of concerns/fears  - Identify coping mechanisms  - Assist in developing anxiety-reducing skills  - Administer/offer alternative therapies  - Limit or eliminate stimulants  Outcome: Adequate for Discharge     Problem: Alteration in Orientation  Goal: Treatment Goal: Demonstrate a reduction of confusion and improved orientation to person, place, time and/or situation upon discharge, according to optimum baseline/ability  Outcome: Adequate for Discharge  Goal: Interact with staff daily  Description: Interventions:  - Assess and re-assess patient's level of orientation  - Engage patient in 1 on 1 interactions, daily, for a minimum of 15 minutes   - Establish rapport/trust with patient   Outcome: Adequate for Discharge  Goal: Express concerns related to confused thinking related to:  Description: Interventions:  - Encourage patient to express feelings, fears, frustrations, hopes  - Assign consistent caregivers   - [place]/re-orient patient as needed  - Allow comfort items, as appropriate  - Provide visual cues,  signs, etc.   Outcome: Adequate for Discharge  Goal: Allow medical examinations, as recommended  Description: Interventions:  - Provide physical/neurological exams and/or referrals, per provider   Outcome: Adequate for Discharge  Goal: Cooperate with recommended testing/procedures  Description: Interventions:  - Determine need for ancillary testing  - Observe for mental status changes  - Implement falls/precaution protocol   Outcome: Adequate for Discharge  Goal: Attend and participate in unit activities, including therapeutic, recreational, and educational groups  Description: Interventions:  - Provide therapeutic and educational activities daily, encourage attendance and participation, and document same in the medical record   - Provide appropriate opportunities for reminiscence   - Provide a consistent daily routine   - Encourage family contact/visitation   Outcome: Adequate for Discharge  Goal: Complete daily ADLs, including personal hygiene independently, as able  Description: Interventions:  - Observe, teach, and assist patient with ADLS  Outcome: Adequate for Discharge     Problem: DISCHARGE PLANNING  Goal: Discharge to home or other facility with appropriate resources  Description: INTERVENTIONS:  - Identify barriers to discharge w/patient and caregiver  - Arrange for needed discharge resources and transportation as appropriate  - Identify discharge learning needs (meds, wound care, etc.)  - Arrange for interpretive services to assist at discharge as needed  - Refer to Case Management Department for coordinating discharge planning if the patient needs post-hospital services based on physician/advanced practitioner order or complex needs related to functional status, cognitive ability, or social support system  Outcome: Adequate for Discharge     Problem: Ineffective Coping  Goal: Participates in unit activities  Description: Interventions:  - Provide therapeutic environment   - Provide required programming    - Redirect inappropriate behaviors   Outcome: Adequate for Discharge

## 2024-09-09 NOTE — NURSING NOTE
"Tiff is calm and quiet when approached. She states, \"I have a lot of things on my mind right now. When I get home I have a lot to do.\" Denies SI/HI/AVH. Reports readiness for discharge and denies any unmet needs at this time. Encouraged to come to staff with questions or concerns.   "

## 2024-09-09 NOTE — PLAN OF CARE
Problem: Alteration in Thoughts and Perception  Goal: Treatment Goal: Gain control of psychotic behaviors/thinking, reduce/eliminate presenting symptoms and demonstrate improved reality functioning upon discharge  Outcome: Progressing  Goal: Verbalize thoughts and feelings  Description: Interventions:  - Promote a nonjudgmental and trusting relationship with the patient through active listening and therapeutic communication  - Assess patient's level of functioning, behavior and potential for risk  - Engage patient in 1 on 1 interactions  - Encourage patient to express fears, feelings, frustrations, and discuss symptoms    - Welcome patient to reality, help patient recognize reality-based thinking   - Administer medications as ordered and assess for potential side effects  - Provide the patient education related to the signs and symptoms of the illness and desired effects of prescribed medications  Outcome: Progressing  Goal: Refrain from acting on delusional thinking/internal stimuli  Description: Interventions:  - Monitor patient closely, per order   - Utilize least restrictive measures   - Set reasonable limits, give positive feedback for acceptable   - Administer medications as ordered and monitor of potential side effects  Outcome: Progressing  Goal: Agree to be compliant with medication regime, as prescribed and report medication side effects  Description: Interventions:  - Offer appropriate PRN medication and supervise ingestion; conduct AIMS, as needed   Outcome: Progressing  Goal: Attend and participate in unit activities, including therapeutic, recreational, and educational groups  Description: Interventions:  -Encourage Visitation and family involvement in care  Outcome: Progressing  Goal: Recognize dysfunctional thoughts, communicate reality-based thoughts at the time of discharge  Description: Interventions:  - Provide medication and psycho-education to assist patient in compliance and developing  insight into his/her illness   Outcome: Not Progressing  Goal: Complete daily ADLs, including personal hygiene independently, as able  Description: Interventions:  - Observe, teach, and assist patient with ADLS  - Monitor and promote a balance of rest/activity, with adequate nutrition and elimination   Outcome: Progressing     Problem: Anxiety  Goal: Anxiety is at manageable level  Description: Interventions:  - Assess and monitor patient's anxiety level.   - Monitor for signs and symptoms (heart palpitations, chest pain, shortness of breath, headaches, nausea, feeling jumpy, restlessness, irritable, apprehensive).   - Collaborate with interdisciplinary team and initiate plan and interventions as ordered.  - [place] patient to unit/surroundings  - Explain treatment plan  - Encourage participation in care  - Encourage verbalization of concerns/fears  - Identify coping mechanisms  - Assist in developing anxiety-reducing skills  - Administer/offer alternative therapies  - Limit or eliminate stimulants  Outcome: Progressing     Problem: Alteration in Orientation  Goal: Treatment Goal: Demonstrate a reduction of confusion and improved orientation to person, place, time and/or situation upon discharge, according to optimum baseline/ability  Outcome: Progressing  Goal: Interact with staff daily  Description: Interventions:  - Assess and re-assess patient's level of orientation  - Engage patient in 1 on 1 interactions, daily, for a minimum of 15 minutes   - Establish rapport/trust with patient   Outcome: Progressing  Goal: Express concerns related to confused thinking related to:  Description: Interventions:  - Encourage patient to express feelings, fears, frustrations, hopes  - Assign consistent caregivers   - [place]/re-orient patient as needed  - Allow comfort items, as appropriate  - Provide visual cues, signs, etc.   Outcome: Not Progressing  Goal: Allow medical examinations, as recommended  Description:  Interventions:  - Provide physical/neurological exams and/or referrals, per provider   Outcome: Progressing  Goal: Cooperate with recommended testing/procedures  Description: Interventions:  - Determine need for ancillary testing  - Observe for mental status changes  - Implement falls/precaution protocol   Outcome: Progressing  Goal: Attend and participate in unit activities, including therapeutic, recreational, and educational groups  Description: Interventions:  - Provide therapeutic and educational activities daily, encourage attendance and participation, and document same in the medical record   - Provide appropriate opportunities for reminiscence   - Provide a consistent daily routine   - Encourage family contact/visitation   Outcome: Progressing  Goal: Complete daily ADLs, including personal hygiene independently, as able  Description: Interventions:  - Observe, teach, and assist patient with ADLS  Outcome: Progressing     Problem: Ineffective Coping  Goal: Participates in unit activities  Description: Interventions:  - Provide therapeutic environment   - Provide required programming   - Redirect inappropriate behaviors   Outcome: Progressing     Problem: DISCHARGE PLANNING  Goal: Discharge to home or other facility with appropriate resources  Description: INTERVENTIONS:  - Identify barriers to discharge w/patient and caregiver  - Arrange for needed discharge resources and transportation as appropriate  - Identify discharge learning needs (meds, wound care, etc.)  - Arrange for interpretive services to assist at discharge as needed  - Refer to Case Management Department for coordinating discharge planning if the patient needs post-hospital services based on physician/advanced practitioner order or complex needs related to functional status, cognitive ability, or social support system  Outcome: Progressing

## 2024-09-09 NOTE — PROGRESS NOTES
09/09/24 1000   Activity/Group Checklist   Group Impromptu group (Comment)  (Discussion with AMADOR/L and peers about anxiety due to upcoming d/c and plans/coping techniques.)   Attendance Attended   Attendance Duration (min) 0-15   Interactions Interacted appropriately   Affect/Mood Appropriate;Bright   Goals Achieved Identified feelings;Discussed coping strategies;Able to listen to others;Able to engage in interactions;Able to reflect/comment on own behavior;Able to self-disclose;Able to recieve feedback;Able to give feedback to another

## 2024-09-09 NOTE — CASE MANAGEMENT
CM met with pt to check in about dc plans today. Pt signed 72 hour notice on Friday 9/6/24 @ 3:08pm  Pt reported feeling ready to dc.     Nusrat from [facility] CM is here to meet with pt for intake. CM encouraged her to follow up with Peg after discharge to see what resources they can assist her with. (Rental assistance for new apartment ect)     Pt interested in Intake appt for Medication Management. CM will refer pt to [facility] and they will contact her directly to schedule an appt. Pt verbalized understanding.     CM advised pt to call 2-1-1 again to follow up on referral for shelter options and to ask about [facility] shelter. Pt verbalized understanding and agreement.       11:15am  CM sent pt's psych eval, face sheet and medication list to [facility] ([email or web address]) and let them know that pt discharged today. CM asked if they can contact pt on her cell phone to schedule an intake appt. They reported that they will contact pt directly to schedule her appt and CM will fax them dc summary.

## 2024-09-09 NOTE — CASE MANAGEMENT
JOSH contacted Nusrat from [facility] and let her know that pt signed 72 hour notice on Friday and will dc today. Peg asked if she can still meet with pt today to complete intake and see how she can help her. JOSH informed her that pt will dc around 11am. Peg will come to meet with pt before then.

## 2024-09-12 NOTE — PROGRESS NOTES
09/09/24 0754   Team Meeting   Meeting Type Daily Rounds   Team Members Present   Team Members Present Physician;Nurse;;Other (Discipline and Name)   Physician Team Member Dr. Amaro / THO Pollock / PA Students   Nursing Team Member Conchis / Lew / Nursing Students   Care Management Team Member Mara / Shawanda   Other (Discipline and Name) Sigmund - Group Facilitator   Patient/Family Present   Patient Present No   Patient's Family Present No     DC: Today - Pt signed 72 hour notice on Friday 9/6/24 and reported she feels ready to dc.

## 2024-09-15 ENCOUNTER — HOSPITAL ENCOUNTER (EMERGENCY)
Facility: HOSPITAL | Age: 39
Discharge: HOME | End: 2024-09-15
Attending: EMERGENCY MEDICINE
Payer: MEDICARE

## 2024-09-15 VITALS
SYSTOLIC BLOOD PRESSURE: 145 MMHG | OXYGEN SATURATION: 99 % | DIASTOLIC BLOOD PRESSURE: 84 MMHG | BODY MASS INDEX: 25.07 KG/M2 | TEMPERATURE: 99 F | HEART RATE: 79 BPM | WEIGHT: 160.05 LBS | RESPIRATION RATE: 18 BRPM

## 2024-09-15 DIAGNOSIS — F22 PARANOIA (HCC): Primary | ICD-10-CM

## 2024-09-15 PROCEDURE — 99283 EMERGENCY DEPT VISIT LOW MDM: CPT

## 2024-09-15 PROCEDURE — 99285 EMERGENCY DEPT VISIT HI MDM: CPT
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
## (undated) DEVICE — CANNULATED COUNTERSINK: Brand: ASNIS

## (undated) DEVICE — CHLORAPREP HI-LITE 26ML ORANGE

## (undated) DEVICE — PENCIL ELECTROSURG E-Z CLEAN -0035H

## (undated) DEVICE — STOCKINETTE 2P PREROLLD 6X60

## (undated) DEVICE — SINGLE PORT MANIFOLD: Brand: NEPTUNE 2

## (undated) DEVICE — NEEDLE 25G X 1 1/2

## (undated) DEVICE — BULB SYRINGE,IRRIGATION WITH PROTECTIVE CAP: Brand: DOVER

## (undated) DEVICE — CUFF TOURNIQUET 18 X 4 IN QUICK CONNECT DISP 1 BLADDER

## (undated) DEVICE — SYRINGE 10ML LL

## (undated) DEVICE — SCD SEQUENTIAL COMPRESSION COMFORT SLEEVE MEDIUM KNEE LENGTH: Brand: KENDALL SCD

## (undated) DEVICE — SUT PROLENE 4-0 PS-2 18 IN 8682G

## (undated) DEVICE — CURITY NON-ADHERENT STRIPS: Brand: CURITY

## (undated) DEVICE — STERILE POLYISOPRENE POWDER-FREE SURGICAL GLOVES: Brand: PROTEXIS

## (undated) DEVICE — INTENDED FOR TISSUE SEPARATION, AND OTHER PROCEDURES THAT REQUIRE A SHARP SURGICAL BLADE TO PUNCTURE OR CUT.: Brand: BARD-PARKER ® SAFETYLOCK CARBON RIB-BACK BLADES

## (undated) DEVICE — K-WIRE
Type: IMPLANTABLE DEVICE | Site: FOOT | Status: NON-FUNCTIONAL
Brand: ASNIS
Removed: 2023-01-18

## (undated) DEVICE — GAUZE SPONGES,16 PLY: Brand: CURITY

## (undated) DEVICE — NEEDLE BLUNT 18 G X 1 1/2IN

## (undated) DEVICE — SUT VICRYL 2-0 SH 27 IN UNDYED J417H

## (undated) DEVICE — COBAN 4 IN STERILE

## (undated) DEVICE — SUT VICRYL 4-0 PS-2 18 IN J496G

## (undated) DEVICE — POV-IOD SOLUTION 4OZ BT

## (undated) DEVICE — SUT VICRYL 3-0 SH 27 IN J416H

## (undated) DEVICE — DISPOSABLE OR TOWEL: Brand: CARDINAL HEALTH

## (undated) DEVICE — BETHLEHEM UNIVERSAL  MIONR EXT: Brand: CARDINAL HEALTH

## (undated) DEVICE — INTENDED FOR TISSUE SEPARATION, AND OTHER PROCEDURES THAT REQUIRE A SHARP SURGICAL BLADE TO PUNCTURE OR CUT.: Brand: BARD-PARKER SAFETY BLADES SIZE 15, STERILE

## (undated) DEVICE — STRETCH BANDAGE: Brand: CURITY

## (undated) DEVICE — THIN OFFSET (9.0 X 0.38 X 25.0MM)

## (undated) DEVICE — K-WIRE
Type: IMPLANTABLE DEVICE | Site: FOOT | Status: NON-FUNCTIONAL
Brand: ASNIS
Removed: 2022-12-05

## (undated) DEVICE — ACE WRAP 4 IN UNSTERILE